# Patient Record
Sex: FEMALE | Race: WHITE | Employment: UNEMPLOYED | URBAN - METROPOLITAN AREA
[De-identification: names, ages, dates, MRNs, and addresses within clinical notes are randomized per-mention and may not be internally consistent; named-entity substitution may affect disease eponyms.]

---

## 2017-01-05 PROBLEM — K76.82 HEPATIC ENCEPHALOPATHY: Status: ACTIVE | Noted: 2017-01-05

## 2017-03-10 PROBLEM — E03.9 HYPOTHYROIDISM: Status: ACTIVE | Noted: 2017-03-10

## 2017-03-10 PROBLEM — K52.9 GASTROENTERITIS: Status: ACTIVE | Noted: 2017-03-10

## 2017-03-10 PROBLEM — R10.9 ABDOMINAL PAIN: Status: ACTIVE | Noted: 2017-03-10

## 2017-03-10 PROBLEM — K42.9 UMBILICAL HERNIA: Status: ACTIVE | Noted: 2017-03-10

## 2017-03-10 PROBLEM — F41.8 DEPRESSION WITH ANXIETY: Status: ACTIVE | Noted: 2017-03-10

## 2017-11-28 PROBLEM — N93.9 VAGINAL BLEEDING: Status: ACTIVE | Noted: 2017-11-28

## 2019-02-15 ENCOUNTER — HOSPITAL ENCOUNTER (INPATIENT)
Age: 64
LOS: 10 days | Discharge: HOSPICE | DRG: 640 | End: 2019-02-25
Attending: EMERGENCY MEDICINE | Admitting: INTERNAL MEDICINE
Payer: MEDICARE

## 2019-02-15 ENCOUNTER — APPOINTMENT (OUTPATIENT)
Dept: GENERAL RADIOLOGY | Age: 64
DRG: 640 | End: 2019-02-15
Payer: MEDICARE

## 2019-02-15 DIAGNOSIS — I47.21 TORSADES DE POINTES: Primary | ICD-10-CM

## 2019-02-15 DIAGNOSIS — I46.9 PEA (PULSELESS ELECTRICAL ACTIVITY) (HCC): ICD-10-CM

## 2019-02-15 DIAGNOSIS — E87.5 HYPERKALEMIA: ICD-10-CM

## 2019-02-15 PROBLEM — R00.1 BRADYCARDIA: Status: ACTIVE | Noted: 2019-02-15

## 2019-02-15 LAB
A/G RATIO: 0.9 (ref 1.1–2.2)
ABO/RH: NORMAL
ALBUMIN SERPL-MCNC: 2.4 G/DL (ref 3.4–5)
ALP BLD-CCNC: 114 U/L (ref 40–129)
ALT SERPL-CCNC: 22 U/L (ref 10–40)
AMPHETAMINE SCREEN, URINE: ABNORMAL
ANION GAP SERPL CALCULATED.3IONS-SCNC: 16 MMOL/L (ref 3–16)
ANISOCYTOSIS: ABNORMAL
ANTIBODY SCREEN: NORMAL
AST SERPL-CCNC: 53 U/L (ref 15–37)
ATYPICAL LYMPHOCYTE RELATIVE PERCENT: 1 % (ref 0–6)
BANDED NEUTROPHILS RELATIVE PERCENT: 2 % (ref 0–7)
BARBITURATE SCREEN URINE: ABNORMAL
BASE EXCESS ARTERIAL: -5 (ref -3–3)
BASOPHILS ABSOLUTE: 0 K/UL (ref 0–0.2)
BASOPHILS RELATIVE PERCENT: 0 %
BENZODIAZEPINE SCREEN, URINE: POSITIVE
BILIRUB SERPL-MCNC: 1 MG/DL (ref 0–1)
BUN BLDV-MCNC: 12 MG/DL (ref 7–20)
CALCIUM IONIZED: 1.53 MMOL/L (ref 1.12–1.32)
CALCIUM SERPL-MCNC: 14.3 MG/DL (ref 8.3–10.6)
CANNABINOID SCREEN URINE: ABNORMAL
CHLORIDE BLD-SCNC: 93 MMOL/L (ref 99–110)
CO2: 19 MMOL/L (ref 21–32)
COCAINE METABOLITE SCREEN URINE: ABNORMAL
CREAT SERPL-MCNC: 1.4 MG/DL (ref 0.6–1.2)
EKG ATRIAL RATE: 79 BPM
EKG DIAGNOSIS: NORMAL
EKG P AXIS: 135 DEGREES
EKG P-R INTERVAL: 222 MS
EKG Q-T INTERVAL: 362 MS
EKG QRS DURATION: 148 MS
EKG QTC CALCULATION (BAZETT): 415 MS
EKG R AXIS: -47 DEGREES
EKG T AXIS: 53 DEGREES
EKG VENTRICULAR RATE: 79 BPM
EOSINOPHILS ABSOLUTE: 0.2 K/UL (ref 0–0.6)
EOSINOPHILS RELATIVE PERCENT: 2 %
ETHANOL: NORMAL MG/DL (ref 0–0.08)
GFR AFRICAN AMERICAN: 46
GFR NON-AFRICAN AMERICAN: 38
GLOBULIN: 2.6 G/DL
GLUCOSE BLD-MCNC: 163 MG/DL (ref 70–99)
GLUCOSE BLD-MCNC: 188 MG/DL (ref 70–99)
GLUCOSE BLD-MCNC: 327 MG/DL (ref 70–99)
HCO3 ARTERIAL: 20 MMOL/L (ref 21–29)
HCT VFR BLD CALC: 29 % (ref 36–48)
HEMOGLOBIN: 11 GM/DL (ref 12–16)
HEMOGLOBIN: 9.4 G/DL (ref 12–16)
INR BLD: 1.48 (ref 0.86–1.14)
LACTATE: 4.9 MMOL/L (ref 0.4–2)
LACTIC ACID, SEPSIS: 5.7 MMOL/L (ref 0.4–1.9)
LYMPHOCYTES ABSOLUTE: 1.1 K/UL (ref 1–5.1)
LYMPHOCYTES RELATIVE PERCENT: 10 %
Lab: ABNORMAL
MCH RBC QN AUTO: 26.8 PG (ref 26–34)
MCHC RBC AUTO-ENTMCNC: 32.3 G/DL (ref 31–36)
MCV RBC AUTO: 82.7 FL (ref 80–100)
METAMYELOCYTES RELATIVE PERCENT: 1 %
METHADONE SCREEN, URINE: ABNORMAL
MONOCYTES ABSOLUTE: 0.6 K/UL (ref 0–1.3)
MONOCYTES RELATIVE PERCENT: 6 %
NEUTROPHILS ABSOLUTE: 8 K/UL (ref 1.7–7.7)
NEUTROPHILS RELATIVE PERCENT: 78 %
O2 SAT, ARTERIAL: 99 % (ref 93–100)
OPIATE SCREEN URINE: ABNORMAL
OXYCODONE URINE: ABNORMAL
PCO2 ARTERIAL: 33 MM HG (ref 35–45)
PDW BLD-RTO: 19.8 % (ref 12.4–15.4)
PERFORMED ON: ABNORMAL
PERFORMED ON: ABNORMAL
PH ARTERIAL: 7.39 (ref 7.35–7.45)
PH UA: 6
PHENCYCLIDINE SCREEN URINE: ABNORMAL
PLATELET # BLD: 220 K/UL (ref 135–450)
PLATELET SLIDE REVIEW: ADEQUATE
PMV BLD AUTO: 8.1 FL (ref 5–10.5)
PO2 ARTERIAL: 125 MM HG (ref 75–108)
POC HEMATOCRIT: 32 % (ref 36–48)
POC POTASSIUM: 7.8 MMOL/L (ref 3.5–5.1)
POC SAMPLE TYPE: ABNORMAL
POC SODIUM: 130 MMOL/L (ref 136–145)
POTASSIUM SERPL-SCNC: 8.2 MMOL/L (ref 3.5–5.1)
PROPOXYPHENE SCREEN: ABNORMAL
PROTHROMBIN TIME: 16.9 SEC (ref 9.8–13)
RBC # BLD: 3.51 M/UL (ref 4–5.2)
REASON FOR REJECTION: NORMAL
REJECTED TEST: NORMAL
SLIDE REVIEW: ABNORMAL
SODIUM BLD-SCNC: 128 MMOL/L (ref 136–145)
TCO2 ARTERIAL: 21 MMOL/L
TOTAL CK: 93 U/L (ref 26–192)
TOTAL PROTEIN: 5 G/DL (ref 6.4–8.2)
WBC # BLD: 9.9 K/UL (ref 4–11)

## 2019-02-15 PROCEDURE — 2580000003 HC RX 258: Performed by: INTERNAL MEDICINE

## 2019-02-15 PROCEDURE — 94002 VENT MGMT INPAT INIT DAY: CPT

## 2019-02-15 PROCEDURE — 96376 TX/PRO/DX INJ SAME DRUG ADON: CPT

## 2019-02-15 PROCEDURE — 84295 ASSAY OF SERUM SODIUM: CPT

## 2019-02-15 PROCEDURE — 5A1955Z RESPIRATORY VENTILATION, GREATER THAN 96 CONSECUTIVE HOURS: ICD-10-PCS | Performed by: EMERGENCY MEDICINE

## 2019-02-15 PROCEDURE — 6360000002 HC RX W HCPCS: Performed by: INTERNAL MEDICINE

## 2019-02-15 PROCEDURE — 84132 ASSAY OF SERUM POTASSIUM: CPT

## 2019-02-15 PROCEDURE — 82947 ASSAY GLUCOSE BLOOD QUANT: CPT

## 2019-02-15 PROCEDURE — 96365 THER/PROPH/DIAG IV INF INIT: CPT

## 2019-02-15 PROCEDURE — 6360000002 HC RX W HCPCS

## 2019-02-15 PROCEDURE — 85610 PROTHROMBIN TIME: CPT

## 2019-02-15 PROCEDURE — 2500000003 HC RX 250 WO HCPCS: Performed by: INTERNAL MEDICINE

## 2019-02-15 PROCEDURE — 2580000003 HC RX 258

## 2019-02-15 PROCEDURE — 82330 ASSAY OF CALCIUM: CPT

## 2019-02-15 PROCEDURE — 80053 COMPREHEN METABOLIC PANEL: CPT

## 2019-02-15 PROCEDURE — 96366 THER/PROPH/DIAG IV INF ADDON: CPT

## 2019-02-15 PROCEDURE — 0BH18EZ INSERTION OF ENDOTRACHEAL AIRWAY INTO TRACHEA, VIA NATURAL OR ARTIFICIAL OPENING ENDOSCOPIC: ICD-10-PCS | Performed by: EMERGENCY MEDICINE

## 2019-02-15 PROCEDURE — 84145 PROCALCITONIN (PCT): CPT

## 2019-02-15 PROCEDURE — 2000000000 HC ICU R&B

## 2019-02-15 PROCEDURE — 36600 WITHDRAWAL OF ARTERIAL BLOOD: CPT

## 2019-02-15 PROCEDURE — 93010 ELECTROCARDIOGRAM REPORT: CPT | Performed by: INTERNAL MEDICINE

## 2019-02-15 PROCEDURE — 36620 INSERTION CATHETER ARTERY: CPT

## 2019-02-15 PROCEDURE — 82550 ASSAY OF CK (CPK): CPT

## 2019-02-15 PROCEDURE — 36415 COLL VENOUS BLD VENIPUNCTURE: CPT

## 2019-02-15 PROCEDURE — 80307 DRUG TEST PRSMV CHEM ANLYZR: CPT

## 2019-02-15 PROCEDURE — 96375 TX/PRO/DX INJ NEW DRUG ADDON: CPT

## 2019-02-15 PROCEDURE — 02HV33Z INSERTION OF INFUSION DEVICE INTO SUPERIOR VENA CAVA, PERCUTANEOUS APPROACH: ICD-10-PCS | Performed by: EMERGENCY MEDICINE

## 2019-02-15 PROCEDURE — 83605 ASSAY OF LACTIC ACID: CPT

## 2019-02-15 PROCEDURE — 6370000000 HC RX 637 (ALT 250 FOR IP): Performed by: INTERNAL MEDICINE

## 2019-02-15 PROCEDURE — 82803 BLOOD GASES ANY COMBINATION: CPT

## 2019-02-15 PROCEDURE — 6360000002 HC RX W HCPCS: Performed by: EMERGENCY MEDICINE

## 2019-02-15 PROCEDURE — 2500000003 HC RX 250 WO HCPCS: Performed by: EMERGENCY MEDICINE

## 2019-02-15 PROCEDURE — 86850 RBC ANTIBODY SCREEN: CPT

## 2019-02-15 PROCEDURE — 5A1D90Z PERFORMANCE OF URINARY FILTRATION, CONTINUOUS, GREATER THAN 18 HOURS PER DAY: ICD-10-PCS | Performed by: INTERNAL MEDICINE

## 2019-02-15 PROCEDURE — 71045 X-RAY EXAM CHEST 1 VIEW: CPT

## 2019-02-15 PROCEDURE — 2580000003 HC RX 258: Performed by: EMERGENCY MEDICINE

## 2019-02-15 PROCEDURE — 02HV33Z INSERTION OF INFUSION DEVICE INTO SUPERIOR VENA CAVA, PERCUTANEOUS APPROACH: ICD-10-PCS | Performed by: INTERNAL MEDICINE

## 2019-02-15 PROCEDURE — 99291 CRITICAL CARE FIRST HOUR: CPT | Performed by: INTERNAL MEDICINE

## 2019-02-15 PROCEDURE — 99285 EMERGENCY DEPT VISIT HI MDM: CPT

## 2019-02-15 PROCEDURE — G0480 DRUG TEST DEF 1-7 CLASSES: HCPCS

## 2019-02-15 PROCEDURE — 96361 HYDRATE IV INFUSION ADD-ON: CPT

## 2019-02-15 PROCEDURE — 51702 INSERT TEMP BLADDER CATH: CPT

## 2019-02-15 PROCEDURE — 85014 HEMATOCRIT: CPT

## 2019-02-15 PROCEDURE — 86900 BLOOD TYPING SEROLOGIC ABO: CPT

## 2019-02-15 PROCEDURE — C9113 INJ PANTOPRAZOLE SODIUM, VIA: HCPCS | Performed by: INTERNAL MEDICINE

## 2019-02-15 PROCEDURE — 36620 INSERTION CATHETER ARTERY: CPT | Performed by: INTERNAL MEDICINE

## 2019-02-15 PROCEDURE — 86901 BLOOD TYPING SEROLOGIC RH(D): CPT

## 2019-02-15 PROCEDURE — 93005 ELECTROCARDIOGRAM TRACING: CPT | Performed by: INTERNAL MEDICINE

## 2019-02-15 PROCEDURE — 87040 BLOOD CULTURE FOR BACTERIA: CPT

## 2019-02-15 PROCEDURE — 85025 COMPLETE CBC W/AUTO DIFF WBC: CPT

## 2019-02-15 RX ORDER — LORAZEPAM 2 MG/ML
2 INJECTION INTRAMUSCULAR
Status: DISCONTINUED | OUTPATIENT
Start: 2019-02-15 | End: 2019-02-25 | Stop reason: HOSPADM

## 2019-02-15 RX ORDER — MAGNESIUM SULFATE IN WATER 40 MG/ML
INJECTION, SOLUTION INTRAVENOUS CONTINUOUS PRN
Status: COMPLETED | OUTPATIENT
Start: 2019-02-15 | End: 2019-02-15

## 2019-02-15 RX ORDER — LORAZEPAM 1 MG/1
2 TABLET ORAL
Status: DISCONTINUED | OUTPATIENT
Start: 2019-02-15 | End: 2019-02-25 | Stop reason: HOSPADM

## 2019-02-15 RX ORDER — ALBUTEROL SULFATE 90 UG/1
2 AEROSOL, METERED RESPIRATORY (INHALATION) EVERY 6 HOURS PRN
COMMUNITY

## 2019-02-15 RX ORDER — POTASSIUM CHLORIDE 750 MG/1
40 TABLET, EXTENDED RELEASE ORAL 4 TIMES DAILY
COMMUNITY

## 2019-02-15 RX ORDER — MAGNESIUM OXIDE 400 MG/1
400 TABLET ORAL 2 TIMES DAILY
COMMUNITY

## 2019-02-15 RX ORDER — ALBUTEROL SULFATE 90 UG/1
2 AEROSOL, METERED RESPIRATORY (INHALATION) EVERY 6 HOURS PRN
Status: DISCONTINUED | OUTPATIENT
Start: 2019-02-15 | End: 2019-02-19

## 2019-02-15 RX ORDER — DOXEPIN HYDROCHLORIDE 50 MG/1
50-100 CAPSULE ORAL NIGHTLY
COMMUNITY

## 2019-02-15 RX ORDER — MIDAZOLAM HYDROCHLORIDE 1 MG/ML
2 INJECTION INTRAMUSCULAR; INTRAVENOUS
Status: DISCONTINUED | OUTPATIENT
Start: 2019-02-15 | End: 2019-02-25 | Stop reason: HOSPADM

## 2019-02-15 RX ORDER — MIDAZOLAM HYDROCHLORIDE 5 MG/ML
10 INJECTION INTRAMUSCULAR; INTRAVENOUS ONCE
Status: COMPLETED | OUTPATIENT
Start: 2019-02-15 | End: 2019-02-15

## 2019-02-15 RX ORDER — TORSEMIDE 20 MG/1
20 TABLET ORAL 2 TIMES DAILY
COMMUNITY

## 2019-02-15 RX ORDER — MIDAZOLAM HYDROCHLORIDE 5 MG/ML
INJECTION INTRAMUSCULAR; INTRAVENOUS
Status: COMPLETED
Start: 2019-02-15 | End: 2019-02-15

## 2019-02-15 RX ORDER — FENTANYL CITRATE 50 UG/ML
50 INJECTION, SOLUTION INTRAMUSCULAR; INTRAVENOUS
Status: DISCONTINUED | OUTPATIENT
Start: 2019-02-15 | End: 2019-02-25 | Stop reason: HOSPADM

## 2019-02-15 RX ORDER — LORAZEPAM 1 MG/1
3 TABLET ORAL
Status: DISCONTINUED | OUTPATIENT
Start: 2019-02-15 | End: 2019-02-25 | Stop reason: HOSPADM

## 2019-02-15 RX ORDER — HEPARIN SODIUM 1000 [USP'U]/ML
2700 INJECTION, SOLUTION INTRAVENOUS; SUBCUTANEOUS PRN
Status: DISCONTINUED | OUTPATIENT
Start: 2019-02-15 | End: 2019-02-25 | Stop reason: HOSPADM

## 2019-02-15 RX ORDER — SPIRONOLACTONE 100 MG/1
100 TABLET, FILM COATED ORAL DAILY
COMMUNITY

## 2019-02-15 RX ORDER — LORAZEPAM 1 MG/1
1 TABLET ORAL
Status: DISCONTINUED | OUTPATIENT
Start: 2019-02-15 | End: 2019-02-25 | Stop reason: HOSPADM

## 2019-02-15 RX ORDER — LIDOCAINE HYDROCHLORIDE 10 MG/ML
INJECTION, SOLUTION INFILTRATION; PERINEURAL DAILY PRN
Status: COMPLETED | OUTPATIENT
Start: 2019-02-15 | End: 2019-02-15

## 2019-02-15 RX ORDER — SODIUM CHLORIDE 9 MG/ML
INJECTION, SOLUTION INTRAVENOUS CONTINUOUS
Status: DISCONTINUED | OUTPATIENT
Start: 2019-02-15 | End: 2019-02-18

## 2019-02-15 RX ORDER — CALCIUM CHLORIDE 100 MG/ML
INJECTION INTRAVENOUS; INTRAVENTRICULAR DAILY PRN
Status: COMPLETED | OUTPATIENT
Start: 2019-02-15 | End: 2019-02-15

## 2019-02-15 RX ORDER — DEXTROSE MONOHYDRATE 25 G/50ML
INJECTION, SOLUTION INTRAVENOUS DAILY PRN
Status: COMPLETED | OUTPATIENT
Start: 2019-02-15 | End: 2019-02-15

## 2019-02-15 RX ORDER — SODIUM CHLORIDE 0.9 % (FLUSH) 0.9 %
10 SYRINGE (ML) INJECTION EVERY 12 HOURS SCHEDULED
Status: DISCONTINUED | OUTPATIENT
Start: 2019-02-15 | End: 2019-02-25 | Stop reason: HOSPADM

## 2019-02-15 RX ORDER — PANTOPRAZOLE SODIUM 40 MG/10ML
40 INJECTION, POWDER, LYOPHILIZED, FOR SOLUTION INTRAVENOUS 2 TIMES DAILY
Status: DISCONTINUED | OUTPATIENT
Start: 2019-02-15 | End: 2019-02-25 | Stop reason: HOSPADM

## 2019-02-15 RX ORDER — LORAZEPAM 1 MG/1
4 TABLET ORAL
Status: DISCONTINUED | OUTPATIENT
Start: 2019-02-15 | End: 2019-02-25 | Stop reason: HOSPADM

## 2019-02-15 RX ORDER — SODIUM CHLORIDE 0.9 % (FLUSH) 0.9 %
10 SYRINGE (ML) INJECTION PRN
Status: DISCONTINUED | OUTPATIENT
Start: 2019-02-15 | End: 2019-02-25 | Stop reason: HOSPADM

## 2019-02-15 RX ORDER — ONDANSETRON 2 MG/ML
4 INJECTION INTRAMUSCULAR; INTRAVENOUS EVERY 6 HOURS PRN
Status: DISCONTINUED | OUTPATIENT
Start: 2019-02-15 | End: 2019-02-25 | Stop reason: HOSPADM

## 2019-02-15 RX ORDER — METOLAZONE 2.5 MG/1
2.5 TABLET ORAL EVERY OTHER DAY
COMMUNITY

## 2019-02-15 RX ORDER — TRAZODONE HYDROCHLORIDE 50 MG/1
50 TABLET ORAL NIGHTLY
COMMUNITY

## 2019-02-15 RX ORDER — LORAZEPAM 2 MG/ML
3 INJECTION INTRAMUSCULAR
Status: DISCONTINUED | OUTPATIENT
Start: 2019-02-15 | End: 2019-02-25 | Stop reason: HOSPADM

## 2019-02-15 RX ORDER — PROPOFOL 10 MG/ML
10 INJECTION, EMULSION INTRAVENOUS
Status: DISCONTINUED | OUTPATIENT
Start: 2019-02-16 | End: 2019-02-24

## 2019-02-15 RX ORDER — LORAZEPAM 2 MG/ML
4 INJECTION INTRAMUSCULAR
Status: DISCONTINUED | OUTPATIENT
Start: 2019-02-15 | End: 2019-02-25 | Stop reason: HOSPADM

## 2019-02-15 RX ORDER — 0.9 % SODIUM CHLORIDE 0.9 %
1000 INTRAVENOUS SOLUTION INTRAVENOUS ONCE
Status: COMPLETED | OUTPATIENT
Start: 2019-02-15 | End: 2019-02-15

## 2019-02-15 RX ORDER — LORAZEPAM 2 MG/ML
1 INJECTION INTRAMUSCULAR
Status: DISCONTINUED | OUTPATIENT
Start: 2019-02-15 | End: 2019-02-25 | Stop reason: HOSPADM

## 2019-02-15 RX ORDER — SODIUM CHLORIDE 9 MG/ML
INJECTION, SOLUTION INTRAVENOUS
Status: COMPLETED
Start: 2019-02-15 | End: 2019-02-15

## 2019-02-15 RX ADMIN — EPINEPHRINE 1 MG: 0.1 INJECTION, SOLUTION ENDOTRACHEAL; INTRACARDIAC; INTRAVENOUS at 14:17

## 2019-02-15 RX ADMIN — SODIUM CHLORIDE: 9 INJECTION, SOLUTION INTRAVENOUS at 15:53

## 2019-02-15 RX ADMIN — CALCIUM CHLORIDE 1 G: 100 INJECTION INTRAVENOUS; INTRAVENTRICULAR at 14:19

## 2019-02-15 RX ADMIN — SODIUM BICARBONATE 50 MEQ: 84 INJECTION, SOLUTION INTRAVENOUS at 14:25

## 2019-02-15 RX ADMIN — Medication 500 ML/HR: at 17:58

## 2019-02-15 RX ADMIN — MIDAZOLAM HYDROCHLORIDE 10 MG: 5 INJECTION INTRAMUSCULAR; INTRAVENOUS at 14:46

## 2019-02-15 RX ADMIN — MIDAZOLAM 2 MG/HR: 5 INJECTION INTRAMUSCULAR; INTRAVENOUS at 16:09

## 2019-02-15 RX ADMIN — LIDOCAINE HYDROCHLORIDE 100 MG: 10 INJECTION, SOLUTION INFILTRATION; PERINEURAL at 14:23

## 2019-02-15 RX ADMIN — MIDAZOLAM HYDROCHLORIDE 2 MG: 1 INJECTION, SOLUTION INTRAMUSCULAR; INTRAVENOUS at 22:06

## 2019-02-15 RX ADMIN — SODIUM BICARBONATE 50 MEQ: 84 INJECTION, SOLUTION INTRAVENOUS at 14:17

## 2019-02-15 RX ADMIN — PANTOPRAZOLE SODIUM 40 MG: 40 INJECTION, POWDER, FOR SOLUTION INTRAVENOUS at 22:11

## 2019-02-15 RX ADMIN — SODIUM CHLORIDE 1000 ML: 9 INJECTION, SOLUTION INTRAVENOUS at 14:45

## 2019-02-15 RX ADMIN — CALCIUM CHLORIDE 1 G: 100 INJECTION INTRAVENOUS; INTRAVENTRICULAR at 14:12

## 2019-02-15 RX ADMIN — MAGNESIUM SULFATE HEPTAHYDRATE 2 G: 40 INJECTION, SOLUTION INTRAVENOUS at 14:25

## 2019-02-15 RX ADMIN — MIDAZOLAM HYDROCHLORIDE 10 MG: 5 INJECTION, SOLUTION INTRAMUSCULAR; INTRAVENOUS at 15:33

## 2019-02-15 RX ADMIN — SODIUM CHLORIDE 3 G: 900 INJECTION INTRAVENOUS at 21:13

## 2019-02-15 RX ADMIN — MIDAZOLAM HYDROCHLORIDE 10 MG: 5 INJECTION INTRAMUSCULAR; INTRAVENOUS at 15:33

## 2019-02-15 RX ADMIN — Medication 2 MCG/MIN: at 15:21

## 2019-02-15 RX ADMIN — MIDAZOLAM HYDROCHLORIDE 10 MG: 5 INJECTION, SOLUTION INTRAMUSCULAR; INTRAVENOUS at 14:46

## 2019-02-15 RX ADMIN — CALCIUM CHLORIDE 1 G: 100 INJECTION INTRAVENOUS; INTRAVENTRICULAR at 14:23

## 2019-02-15 RX ADMIN — DEXTROSE MONOHYDRATE 25 G: 25 INJECTION, SOLUTION INTRAVENOUS at 14:23

## 2019-02-15 RX ADMIN — EPINEPHRINE 1 MG: 0.1 INJECTION, SOLUTION ENDOTRACHEAL; INTRACARDIAC; INTRAVENOUS at 14:12

## 2019-02-15 RX ADMIN — SODIUM CHLORIDE: 9 INJECTION, SOLUTION INTRAVENOUS at 21:00

## 2019-02-15 RX ADMIN — EPINEPHRINE 1 MG: 0.1 INJECTION, SOLUTION ENDOTRACHEAL; INTRACARDIAC; INTRAVENOUS at 14:15

## 2019-02-15 RX ADMIN — Medication 10 ML: at 22:10

## 2019-02-15 RX ADMIN — SODIUM BICARBONATE: 84 INJECTION INTRAVENOUS at 16:13

## 2019-02-15 ASSESSMENT — PULMONARY FUNCTION TESTS
PIF_VALUE: 16
PIF_VALUE: 24
PIF_VALUE: 24

## 2019-02-15 ASSESSMENT — PAIN SCALES - GENERAL: PAINLEVEL_OUTOF10: 0

## 2019-02-15 NOTE — ED NOTES
Charge RN and registration attempted to contact family for patient no number listed in patient chart was valid.  Unable to contact family for patient     Armani Navarro RN  02/15/19 0231

## 2019-02-15 NOTE — ED PROVIDER NOTES
lymph nodes again noted. There is no pneumothorax. 1. Recommend retracting endotracheal tube by 3 cm. 2. Hazy opacity in the medial right heart border may reflect partial atelectasis of the right middle lobe versus an infectious/inflammatory process. Continued radiographic follow-up is recommended. Results were called by Dr. Renny Lay to Dr. Alyce Blunt on 2/15/2019 at 15:30. PROCEDURES  PROCEDURE:  ENDOTRACHEAL INTUBATION  . Surekha Bishop was pre-oxygenated as best as possible. Suction was ready. The patient was sedated, then paralyzed; please see the chart for the medications and dosages administered. The vocal cords were visualized, and the endotracheal tube was inserted without complication. Tracheal position was confirmed using auscultation, capnometry, tube condensation, and chest x-ray. The tube was appropriately secured. Sedation was provided for endotracheal tube and ventilatory comfort. Patient Name: Surekha Bishop   Medical Record Number: [id number]  Date: 2/15/2019   Time: 4:43 PM   Room/Bed: [place] [address]  Central Line Placement Procedure Note  Indication: poor peripheral access    Consent: Unable to be obtained due to the emergent nature of this procedure. Procedure: The patient was positioned appropriately and the skin over the right internal jugular vein was prepped with chlorhexidine. Local anesthesia was not performed due to the emergent nature of this procedure. A large bore needle was used to identify the vein. A guide wire was then inserted into the vein through the needle. A triple lumen catheter was then inserted into the vessel over the guide wire using the Seldinger technique. All ports showed good, free flowing blood return and were flushed with saline solution. The catheter was then securely fastened to the skin with sutures and covered with a sterile dressing. A post procedure X-ray was ordered and is still pending at this time.     The patient tolerated the

## 2019-02-15 NOTE — CONSULTS
Pulmonary & Critical Care Medicine Consultation    ASSESSMENT:  · Acute Hypoxic Respiratory Failure due to cardiac arrest  · Cardiac Arrest due to hyperkalemia  · Circulatory Shock due to cardiac arrest  · Possible Hypoxic Encephalopathy - moves arms and legs but does not follow commands  · Possible Aspiration Pneumonia - bilateral air space disease on chest xray  · Hyperkalemia  · Acute Kidney Injury  · Cirrhosis  · GERD    PLAN:  · Mechanical Ventilation with Assist Control   · Midazolam infusion for sedation  · Fentanyl as needed for analgesia  · Cardiology consult for cardiac arrest  · CRRT per Nephrology  · Sputum culture  · Start empiric Unasyn for possible aspiration pneumonia  · Targeted Temperature Management for possible hypoxic encephalopathy  · Will use temperature of 35-36 degrees rather than hypothermia due to instability with cardiac arrhythmias  · No warming blanket due to possible hypoxic encephalopathy  · Protonix for GERD and GI prophylaxis  · DVT Prophylaxis with Lower Extremity Sequential Compression Device       REASON FOR CONSULTATION/CC: acute respiratory failure     CONSULTING PHYSICIAN: Dr. Rae Lamar:  Unable to obtain due to intubation and sedation. HISTORY OF PRESENT ILLNESS:  The patient is intubated and on mechanical ventilation so the history is from the medical record. She was unable to get up at home so she called 911. She had a cardiac arrest in the ambulance and CPR was started. ACLS protocol was followed in the ED and ROSC was obtained. She had multiple arrhythmias and did require shocks. She was intubated and placed on mechanical ventilation. She is sedated with Midazolam infusion. She was found to have severe hyperkalemia. Nephrology placed a Vascath and CRRT will be started soon. She is on Levophed for circulatory shock. She does not follow commands but does move arms and legs to painful stimulation.      She has a history of cirrhosis and diastolic heart failure. PAST MEDICAL HISTORY:  Past Medical History:   Diagnosis Date    [facility] [facility]     Diastolic CHF ([facility] [address] [facility] 8/2/13    echo    EtOH dependence ([facility] [address] [facility]     Gallstones     GERD (gastroesophageal reflux disease)     Humerus fracture May 2013    Hypothyroidism     Seizure [facility])     last one 2015    Sinusitis chronic, frontal     Spleen enlargement      PAST SURGICAL HISTORY:  Past Surgical History:   Procedure Laterality Date    SINUS SURGERY      TUBAL LIGATION      UPPER GASTROINTESTINAL ENDOSCOPY  11/29/2017       FAMILY HISTORY:  family history is not on file. SOCIAL HISTORY:   reports that she has been smoking Cigarettes. She has a 17.50 pack-year smoking history. She has never used smokeless tobacco.    MEDICATIONS:  Scheduled Meds:   sodium chloride flush  10 mL Intravenous 2 times per day    pantoprazole  40 mg Intravenous BID    ampicillin-sulbactam  3 g Intravenous Q12H     Continuous Infusions:   norepinephrine 34. 027 mcg/min (02/15/19 1709)    prismaSATE BGK 2/0 500 mL/hr (02/15/19 1758)    prismaSol BGK 2/0 500 mL/hr (02/15/19 1758)    sodium chloride 100 mL/hr at 02/15/19 1553    sodium bicarbonate infusion 50 mL/hr at 02/15/19 1613    midazolam 2 mg/hr (02/15/19 1609)     PRN Meds:  albuterol sulfate HFA, sodium chloride flush, magnesium hydroxide, ondansetron, LORazepam **OR** LORazepam **OR** LORazepam **OR** LORazepam **OR** LORazepam **OR** LORazepam **OR** LORazepam **OR** LORazepam, fentanNYL    ALLERGIES:  Patient is allergic to codeine. REVIEW OF SYSTEMS:  Unable to obtain due to intubation and sedation. PHYSICAL EXAM:  VITAL SIGNS: Blood pressure (!) 124/56, pulse 59, temperature 96.4 °F (35.8 °C), temperature source Core, resp. rate 15, weight 200 lb 9.9 oz (91 kg), SpO2 100 %. Gen:   Intubated and on mechanical ventilation. Eyes:   Equal pupils. Clear conjunctiva. ENT:   Endotracheal tube is in place.  Lips and tongue

## 2019-02-15 NOTE — PROGRESS NOTES
Renal Temporary HD Catheter Femoral Procedure Note    Femoral artery and vein localized by ultrasound. Under sterile technique, femoral artery palpated. Needle introduced medial to artery with good venous backflow. Guidewire introduced. Dilator introduced. HD catheter placed over guidewire. Good backflow noted in both lumens.

## 2019-02-15 NOTE — CONSULTS
MD Romi Larson MD Levie Lamas, MD                                  Office: [phone]                 Fax: [phone]          [employer]                     NEPHROLOGY CONSULT NOTE:     PATIENT NAME: Stacy Burrell  : 1955  MRN: [id number]      Patient seen and examined . Brief Summary Note --full consult report follows. R femoral hemodialysis catheter placed . Good flow . No complications. Severe hyperkalemia  With EKG changes     K.8.3  Shock state  Acute Kidney Injury   Severe hypotension on pressors  Chronic liver disease  Ascites       Plan:    Send stat renal panel  on arrival in ICU  Begin CRRT as soon as possible   No anticoagulation ( liver disease)  Continue bi carbonate infusion     Patient is critically ill       renal panel or CBC  UA, urine lytes and urine creatinine or spot urine protein and creatinine ratio  CXR  daily weights, strict I/O's and low sodium diet (i.e., 2 gm)        Active Problems:    Bradycardia  Resolved Problems:    * No resolved hospital problems. *          Labs reviewed by me       CBC:   Recent Labs      02/15/19   1435   WBC  9.9   HGB  9.4*   HCT  29.0*   MCV  82.7   PLT  220     BMP:   Recent Labs      02/15/19   1420   NA  128*   K  8.2*   CL  93*   CO2  19*   BUN  12   CREATININE  1.4*     Magnesium:   Lab Results   Component Value Date    MG 1.80 2017    MG 1.80 2017    MG 1.60 2016       Arterial Blood Gasses  No results for input(s): PH, PCO2, PO2 in the last 72 hours. Invalid input(s): Reese Cuellar    UA:No results for input(s): NITRITE, COLORU, PHUR, LABCAST, WBCUA, RBCUA, MUCUS, TRICHOMONAS, YEAST, BACTERIA, CLARITYU, SPECGRAV, LEUKOCYTESUR, UROBILINOGEN, BILIRUBINUR, BLOODU, GLUCOSEU, AMORPHOUS in the last 72 hours.     Invalid input(s): Viviane Stevens    LIVER PROFILE:   Recent Labs      02/15/19   1420   AST  53*   ALT  22   BILITOT  1.0   ALKPHOS  114     PT/INR:    Lab

## 2019-02-15 NOTE — ED NOTES
Bed: 01  Expected date:   Expected time:   Means of arrival:   Comments:  Emergency Only     Dat Maloney RN  02/15/19 9688

## 2019-02-15 NOTE — H&P
tablet Take 400 mg by mouth 2 times daily   Yes Historical Provider, MD   potassium chloride (KLOR-CON M) 10 MEQ extended release tablet Take 40 mEq by mouth 4 times daily   Yes Historical Provider, MD   spironolactone (ALDACTONE) 100 MG tablet Take 100 mg by mouth daily   Yes Historical Provider, MD   torsemide (DEMADEX) 20 MG tablet Take 20 mg by mouth 2 times daily   Yes Historical Provider, MD   traZODone (DESYREL) 50 MG tablet Take 50 mg by mouth nightly   Yes Historical Provider, MD   metolazone (ZAROXOLYN) 2.5 MG tablet Take 2.5 mg by mouth every other day   Yes Historical Provider, MD   albuterol sulfate  (90 Base) MCG/ACT inhaler Inhale 2 puffs into the lungs every 6 hours as needed for Wheezing   Yes Historical Provider, MD       Allergies:  Codeine    Social History:      The patient currently lives At home by herself    TOBACCO:   reports that she has been smoking Cigarettes. She has a 17.50 pack-year smoking history. She has never used smokeless tobacco.  ETOH:   reports that she does not drink alcohol. Family History:      Reviewed in detail and negative for DM, CAD, Cancer, CVA. Positive as follows:    No family history on file. REVIEW OF SYSTEMS:   Could not be obtained as patient is intubated and no family is available here. PHYSICAL EXAM PERFORMED:    Pulse 99   Temp 97.7 °F (36.5 °C) (Infrared)   Resp 16   Wt 187 lb 6.3 oz (85 kg)   SpO2 100%   BMI 35.41 kg/m²     General appearance: Intubated. HEENT:  Normal cephalic, atraumatic without obvious deformity. Pupils equal, round, and reactive to light. Extra ocular muscles intact. Conjunctivae/corneas clear. Neck: Supple, with full range of motion. No jugular venous distention. Trachea midline. Respiratory: Crackles heard bilaterally anteriorly. Cardiovascular:  Regular rate and rhythm with normal S1/S2 without murmurs, rubs or gallops. Abdomen: Soft, non-tender, non-distended with normal bowel sounds.   Musculoskeletal: No clubbing, cyanosis or edema bilaterally. Full range of motion without deformity. 2 IO  lines one on  each site noted. Skin: Skin color, texture, turgor normal.  No rashes or lesions. Neurologic:  Neurovascularly intact without any focal sensory/motor deficits. Cranial nerves: II-XII intact, grossly non-focal.  Psychiatric: Sedated. Capillary Refill: Brisk,< 3 seconds   Peripheral Pulses: +2 palpable, equal bilaterally       Labs:     No results for input(s): WBC, HGB, HCT, PLT in the last 72 hours. No results for input(s): NA, K, CL, CO2, BUN, CREATININE, CALCIUM, PHOS in the last 72 hours. Invalid input(s): MAGNES  No results for input(s): AST, ALT, BILIDIR, BILITOT, ALKPHOS in the last 72 hours. No results for input(s): INR in the last 72 hours. No results for input(s): [place] [employer] in the last 72 hours. Urinalysis:      Lab Results   Component Value Date    NITRU Negative 11/28/2017    WBCUA 2 11/28/2017    BACTERIA 1+ 11/28/2017    RBCUA 3 11/28/2017    BLOODU Negative 11/28/2017    SPECGRAV 1.019 11/28/2017    GLUCOSEU Negative 11/28/2017       Radiology:       EKG:  I have reviewed the EKG with the following interpretation: bradycardia, sinusoidal and undetermined rhythm noted     XR CHEST PORTABLE    (Results Pending)       ASSESSMENT/PLAN:     #1. Cardiac arrest: Secondary to hyperkalemia and AV block. Status post resuscitation with successful ROSC. Still still hypotensive. Central line placement planned. Levophed drip. Troponins ×2. Echocardiogram. Cardiology consult. Treat hyperkalemia with hemodialysis. #2, hyperkalemia: Nephrology consulted. Hemodialysis catheter replaced right now. CRRT planned in ICU. #3. Acute kidney injury: Fluids per nephrology. #4. Acute respiratory failure: Secondary to cardiac arrest. Intubated. Pulmonology consulted for vent management. #5. Shock: Secondary to cardiac event. Consult cardiology. Labile fed drape. Low suspicion for infection.

## 2019-02-16 ENCOUNTER — APPOINTMENT (OUTPATIENT)
Dept: GENERAL RADIOLOGY | Age: 64
DRG: 640 | End: 2019-02-16
Payer: MEDICARE

## 2019-02-16 LAB
ANION GAP SERPL CALCULATED.3IONS-SCNC: 11 MMOL/L (ref 3–16)
ANION GAP SERPL CALCULATED.3IONS-SCNC: 11 MMOL/L (ref 3–16)
ANION GAP SERPL CALCULATED.3IONS-SCNC: 12 MMOL/L (ref 3–16)
ANION GAP SERPL CALCULATED.3IONS-SCNC: 8 MMOL/L (ref 3–16)
BASE EXCESS ARTERIAL: 0.5 MMOL/L (ref -3–3)
BUN BLDV-MCNC: 10 MG/DL (ref 7–20)
BUN BLDV-MCNC: 10 MG/DL (ref 7–20)
BUN BLDV-MCNC: 11 MG/DL (ref 7–20)
BUN BLDV-MCNC: 8 MG/DL (ref 7–20)
CALCIUM IONIZED: 0.77 MMOL/L (ref 1.12–1.32)
CALCIUM IONIZED: 0.85 MMOL/L (ref 1.12–1.32)
CALCIUM SERPL-MCNC: 6.1 MG/DL (ref 8.3–10.6)
CALCIUM SERPL-MCNC: 6.8 MG/DL (ref 8.3–10.6)
CALCIUM SERPL-MCNC: 7.9 MG/DL (ref 8.3–10.6)
CALCIUM SERPL-MCNC: 9 MG/DL (ref 8.3–10.6)
CARBOXYHEMOGLOBIN ARTERIAL: 1.1 % (ref 0–1.5)
CHLORIDE BLD-SCNC: 95 MMOL/L (ref 99–110)
CHLORIDE BLD-SCNC: 97 MMOL/L (ref 99–110)
CHLORIDE BLD-SCNC: 98 MMOL/L (ref 99–110)
CHLORIDE BLD-SCNC: 99 MMOL/L (ref 99–110)
CO2: 22 MMOL/L (ref 21–32)
CO2: 24 MMOL/L (ref 21–32)
CO2: 24 MMOL/L (ref 21–32)
CO2: 25 MMOL/L (ref 21–32)
CREAT SERPL-MCNC: 1 MG/DL (ref 0.6–1.2)
CREAT SERPL-MCNC: 1.1 MG/DL (ref 0.6–1.2)
CREAT SERPL-MCNC: 1.2 MG/DL (ref 0.6–1.2)
CREAT SERPL-MCNC: 1.2 MG/DL (ref 0.6–1.2)
GFR AFRICAN AMERICAN: 55
GFR AFRICAN AMERICAN: 55
GFR AFRICAN AMERICAN: >60
GFR AFRICAN AMERICAN: >60
GFR NON-AFRICAN AMERICAN: 45
GFR NON-AFRICAN AMERICAN: 45
GFR NON-AFRICAN AMERICAN: 50
GFR NON-AFRICAN AMERICAN: 56
GLUCOSE BLD-MCNC: 152 MG/DL (ref 70–99)
GLUCOSE BLD-MCNC: 178 MG/DL (ref 70–99)
GLUCOSE BLD-MCNC: 180 MG/DL (ref 70–99)
GLUCOSE BLD-MCNC: 205 MG/DL (ref 70–99)
GLUCOSE BLD-MCNC: 219 MG/DL (ref 70–99)
GLUCOSE BLD-MCNC: 221 MG/DL (ref 70–99)
GLUCOSE BLD-MCNC: 232 MG/DL (ref 70–99)
HCO3 ARTERIAL: 25.4 MMOL/L (ref 21–29)
HCT VFR BLD CALC: 30.1 % (ref 36–48)
HCT VFR BLD CALC: 31.5 % (ref 36–48)
HEMOGLOBIN, ART, EXTENDED: 9.6 G/DL (ref 12–16)
HEMOGLOBIN: 10 G/DL (ref 12–16)
HEMOGLOBIN: 9.7 G/DL (ref 12–16)
LACTIC ACID: 3.1 MMOL/L (ref 0.4–2)
LEFT VENTRICULAR EJECTION FRACTION HIGH VALUE: 60 %
LEFT VENTRICULAR EJECTION FRACTION MODE: NORMAL
LV EF: 55 %
LV EF: 58 %
LVEF MODALITY: NORMAL
MAGNESIUM: 2.1 MG/DL (ref 1.8–2.4)
MAGNESIUM: 2.2 MG/DL (ref 1.8–2.4)
METHEMOGLOBIN ARTERIAL: 0.6 %
O2 CONTENT ARTERIAL: 14 ML/DL
O2 SAT, ARTERIAL: 100 %
O2 THERAPY: ABNORMAL
PCO2 ARTERIAL: 41.4 MMHG (ref 35–45)
PERFORMED ON: ABNORMAL
PH ARTERIAL: 7.4 (ref 7.35–7.45)
PH VENOUS: 7.45 (ref 7.35–7.45)
PH VENOUS: 7.47 (ref 7.35–7.45)
PHOSPHORUS: 1.9 MG/DL (ref 2.5–4.9)
PHOSPHORUS: 2.1 MG/DL (ref 2.5–4.9)
PO2 ARTERIAL: 164 MMHG (ref 75–108)
POTASSIUM REFLEX MAGNESIUM: 5.9 MMOL/L (ref 3.5–5.1)
POTASSIUM SERPL-SCNC: 4.6 MMOL/L (ref 3.5–5.1)
POTASSIUM SERPL-SCNC: 5.6 MMOL/L (ref 3.5–5.1)
POTASSIUM SERPL-SCNC: 5.6 MMOL/L (ref 3.5–5.1)
PROCALCITONIN: 0.09 NG/ML (ref 0–0.15)
SODIUM BLD-SCNC: 130 MMOL/L (ref 136–145)
SODIUM BLD-SCNC: 131 MMOL/L (ref 136–145)
SODIUM BLD-SCNC: 132 MMOL/L (ref 136–145)
SODIUM BLD-SCNC: 133 MMOL/L (ref 136–145)
TCO2 ARTERIAL: 59.9 MMOL/L
TROPONIN: 0.03 NG/ML

## 2019-02-16 PROCEDURE — 2580000003 HC RX 258: Performed by: INTERNAL MEDICINE

## 2019-02-16 PROCEDURE — 84100 ASSAY OF PHOSPHORUS: CPT

## 2019-02-16 PROCEDURE — 6370000000 HC RX 637 (ALT 250 FOR IP): Performed by: INTERNAL MEDICINE

## 2019-02-16 PROCEDURE — 6360000002 HC RX W HCPCS: Performed by: INTERNAL MEDICINE

## 2019-02-16 PROCEDURE — 82803 BLOOD GASES ANY COMBINATION: CPT

## 2019-02-16 PROCEDURE — 36415 COLL VENOUS BLD VENIPUNCTURE: CPT

## 2019-02-16 PROCEDURE — 85018 HEMOGLOBIN: CPT

## 2019-02-16 PROCEDURE — 99291 CRITICAL CARE FIRST HOUR: CPT | Performed by: INTERNAL MEDICINE

## 2019-02-16 PROCEDURE — 85014 HEMATOCRIT: CPT

## 2019-02-16 PROCEDURE — 94762 N-INVAS EAR/PLS OXIMTRY CONT: CPT

## 2019-02-16 PROCEDURE — 94003 VENT MGMT INPAT SUBQ DAY: CPT

## 2019-02-16 PROCEDURE — 2500000003 HC RX 250 WO HCPCS: Performed by: INTERNAL MEDICINE

## 2019-02-16 PROCEDURE — 2000000000 HC ICU R&B

## 2019-02-16 PROCEDURE — 93306 TTE W/DOPPLER COMPLETE: CPT

## 2019-02-16 PROCEDURE — 82330 ASSAY OF CALCIUM: CPT

## 2019-02-16 PROCEDURE — 83735 ASSAY OF MAGNESIUM: CPT

## 2019-02-16 PROCEDURE — 80048 BASIC METABOLIC PNL TOTAL CA: CPT

## 2019-02-16 PROCEDURE — 83605 ASSAY OF LACTIC ACID: CPT

## 2019-02-16 PROCEDURE — 94750 HC PULMONARY COMPLIANCE STUDY: CPT

## 2019-02-16 PROCEDURE — 71045 X-RAY EXAM CHEST 1 VIEW: CPT

## 2019-02-16 PROCEDURE — C9113 INJ PANTOPRAZOLE SODIUM, VIA: HCPCS | Performed by: INTERNAL MEDICINE

## 2019-02-16 PROCEDURE — 84484 ASSAY OF TROPONIN QUANT: CPT

## 2019-02-16 PROCEDURE — 2700000000 HC OXYGEN THERAPY PER DAY

## 2019-02-16 PROCEDURE — 94770 HC ETCO2 MONITOR DAILY: CPT

## 2019-02-16 RX ORDER — DEXTROSE MONOHYDRATE 25 G/50ML
12.5 INJECTION, SOLUTION INTRAVENOUS PRN
Status: DISCONTINUED | OUTPATIENT
Start: 2019-02-16 | End: 2019-02-25 | Stop reason: HOSPADM

## 2019-02-16 RX ORDER — DEXTROSE MONOHYDRATE 50 MG/ML
100 INJECTION, SOLUTION INTRAVENOUS PRN
Status: DISCONTINUED | OUTPATIENT
Start: 2019-02-16 | End: 2019-02-25 | Stop reason: HOSPADM

## 2019-02-16 RX ADMIN — Medication 500 ML/HR: at 07:59

## 2019-02-16 RX ADMIN — PROPOFOL 50 MCG/KG/MIN: 10 INJECTION, EMULSION INTRAVENOUS at 00:17

## 2019-02-16 RX ADMIN — Medication 0.33 MCG/KG/MIN: at 20:47

## 2019-02-16 RX ADMIN — PROPOFOL 45 MCG/KG/MIN: 10 INJECTION, EMULSION INTRAVENOUS at 11:18

## 2019-02-16 RX ADMIN — SODIUM PHOSPHATE, MONOBASIC, MONOHYDRATE 6 MMOL: 276; 142 INJECTION, SOLUTION INTRAVENOUS at 14:19

## 2019-02-16 RX ADMIN — PROPOFOL 45 MCG/KG/MIN: 10 INJECTION, EMULSION INTRAVENOUS at 02:24

## 2019-02-16 RX ADMIN — PANTOPRAZOLE SODIUM 40 MG: 40 INJECTION, POWDER, FOR SOLUTION INTRAVENOUS at 20:48

## 2019-02-16 RX ADMIN — PROPOFOL 45 MCG/KG/MIN: 10 INJECTION, EMULSION INTRAVENOUS at 14:18

## 2019-02-16 RX ADMIN — FENTANYL CITRATE 0.5 MCG/KG/HR: 50 INJECTION, SOLUTION INTRAMUSCULAR; INTRAVENOUS at 15:37

## 2019-02-16 RX ADMIN — Medication 10 ML: at 20:48

## 2019-02-16 RX ADMIN — Medication 10 ML: at 09:05

## 2019-02-16 RX ADMIN — Medication 500 ML/HR: at 08:09

## 2019-02-16 RX ADMIN — Medication 1000 ML/HR: at 16:20

## 2019-02-16 RX ADMIN — PROPOFOL 45 MCG/KG/MIN: 10 INJECTION, EMULSION INTRAVENOUS at 05:58

## 2019-02-16 RX ADMIN — Medication 1000 ML/HR: at 21:13

## 2019-02-16 RX ADMIN — PROPOFOL 45 MCG/KG/MIN: 10 INJECTION, EMULSION INTRAVENOUS at 06:52

## 2019-02-16 RX ADMIN — PROPOFOL 35 MCG/KG/MIN: 10 INJECTION, EMULSION INTRAVENOUS at 18:08

## 2019-02-16 RX ADMIN — INSULIN LISPRO 4 UNITS: 100 INJECTION, SOLUTION INTRAVENOUS; SUBCUTANEOUS at 16:45

## 2019-02-16 RX ADMIN — PANTOPRAZOLE SODIUM 40 MG: 40 INJECTION, POWDER, FOR SOLUTION INTRAVENOUS at 09:05

## 2019-02-16 RX ADMIN — INSULIN LISPRO 2 UNITS: 100 INJECTION, SOLUTION INTRAVENOUS; SUBCUTANEOUS at 12:16

## 2019-02-16 RX ADMIN — INSULIN LISPRO 4 UNITS: 100 INJECTION, SOLUTION INTRAVENOUS; SUBCUTANEOUS at 20:19

## 2019-02-16 RX ADMIN — Medication 0.3 MCG/KG/MIN: at 01:11

## 2019-02-16 RX ADMIN — CALCIUM GLUCONATE 2 G: 98 INJECTION, SOLUTION INTRAVENOUS at 16:20

## 2019-02-16 RX ADMIN — SODIUM CHLORIDE 3 G: 900 INJECTION INTRAVENOUS at 20:14

## 2019-02-16 RX ADMIN — SODIUM BICARBONATE: 84 INJECTION INTRAVENOUS at 11:24

## 2019-02-16 RX ADMIN — SODIUM CHLORIDE: 9 INJECTION, SOLUTION INTRAVENOUS at 12:49

## 2019-02-16 RX ADMIN — Medication 0.34 MCG/KG/MIN: at 11:18

## 2019-02-16 RX ADMIN — SODIUM PHOSPHATE, MONOBASIC, MONOHYDRATE 12 MMOL: 276; 142 INJECTION, SOLUTION INTRAVENOUS at 20:14

## 2019-02-16 RX ADMIN — HEPARIN SODIUM 2700 UNITS: 1000 INJECTION INTRAVENOUS; SUBCUTANEOUS at 09:27

## 2019-02-16 RX ADMIN — FENTANYL CITRATE 0.5 MCG/KG/HR: 50 INJECTION, SOLUTION INTRAMUSCULAR; INTRAVENOUS at 00:17

## 2019-02-16 RX ADMIN — SODIUM CHLORIDE 3 G: 900 INJECTION INTRAVENOUS at 09:05

## 2019-02-16 RX ADMIN — CALCIUM GLUCONATE 2 G: 98 INJECTION, SOLUTION INTRAVENOUS at 21:15

## 2019-02-16 ASSESSMENT — PAIN SCALES - GENERAL
PAINLEVEL_OUTOF10: 0
PAINLEVEL_OUTOF10: 0

## 2019-02-16 ASSESSMENT — PULMONARY FUNCTION TESTS
PIF_VALUE: 22
PIF_VALUE: 21
PIF_VALUE: 22

## 2019-02-16 NOTE — CONSULTS
sign monitoring, telemetry monitoring, continuous pulse oximety, IV medication, clinical response to the IV medications, documentation time , consultation time, interpretation of lab data, review of nursing notes and old record review. I appreciate the opportunity of cooperating in the care of this patient.     Kalpana Hollis M.D., [facility]

## 2019-02-16 NOTE — PROGRESS NOTES
Component Value Date    MG 1.80 11/28/2017    MG 1.80 11/27/2017    MG 1.60 12/23/2016     Lab Results   Component Value Date    CREATININE 1.1 02/16/2019     [facility]   CMP:  Recent Labs      02/15/19   1420  02/16/19   0030  02/16/19 0615   NA  128*  131*  133*   K  8.2*  5.9*  5.6*   CL  93*  98*  97*   CO2  19*  22  24   BUN  12  11  10   CREATININE  1.4*  1.2  1.1   CALCIUM  14.3*  9.0  7.9*   GLUCOSE  327*  221*  180*           CBC:  Recent Labs      02/15/19   1435  02/15/19   1754  02/16/19   0030   WBC  9.9   --    --    RBC  3.51*   --    --    HGB  9.4*  11.0*  9.7*   HCT  29.0*   --   30.1*   PLT  220   --    --    MCV  82.7   --    --    MCH  26.8   --    --    MCHC  32.3   --    --    RDW  19.8*   --    --    BANDSPCT  2   --    --       BMP:  Recent Labs      02/15/19   1420  02/16/19 0030  02/16/19 0615   NA  128*  131*  133*   K  8.2*  5.9*  5.6*   CL  93*  98*  97*   CO2  19*  22  24   BUN  12  11  10   CREATININE  1.4*  1.2  1.1   CALCIUM  14.3*  9.0  7.9*   GLUCOSE  327*  221*  180*      ABG:  Recent Labs      02/15/19   1754 02/16/19 0615   PHART  7.391  7.397   MYL4YNE  33.0*  41.4   PO2ART  125.0*  164.0*   BMO0NVQ  20.0*  25.4   H2FCADGA  99  100.0   BEART  -5*  0.5         Arterial Blood Gasses  No results for input(s): PH, PCO2, PO2 in the last 72 hours.     Invalid input(s): [id number], INSPIREDO2    UA:  Recent Labs      02/15/19   1654   PHUR  6.0       LIVER PROFILE:   Recent Labs      02/15/19   1420   AST  53*   ALT  22   BILITOT  1.0   ALKPHOS  114     PT/INR:    Lab Results   Component Value Date    PROTIME 16.9 02/15/2019    PROTIME 17.0 11/27/2017    PROTIME 17.2 03/09/2017    INR 1.48 02/15/2019    INR 1.50 11/27/2017    INR 1.52 03/09/2017     PTT:    Lab Results   Component Value Date    APTT 34.3 11/27/2017    APTT 33.5 03/09/2017    APTT 30.8 12/23/2016     MITA:  Lab Results   Component Value Date    MITA Negative 12/24/2016           RADIOLOGY:

## 2019-02-16 NOTE — PROGRESS NOTES
CRRT  prior to my arrival on shift. Connected patient with Yandel RN at 0898. Numbers obtained at 2000. Access pressure alarming extremely high multiple times. Switched lines but no help. Discovers kinked tubing on machine. Will restart CRRT with new set.

## 2019-02-17 ENCOUNTER — APPOINTMENT (OUTPATIENT)
Dept: GENERAL RADIOLOGY | Age: 64
DRG: 640 | End: 2019-02-17
Payer: MEDICARE

## 2019-02-17 LAB
ALBUMIN SERPL-MCNC: 2.9 G/DL (ref 3.4–5)
ALP BLD-CCNC: 149 U/L (ref 40–129)
ALT SERPL-CCNC: 31 U/L (ref 10–40)
ANION GAP SERPL CALCULATED.3IONS-SCNC: 11 MMOL/L (ref 3–16)
ANION GAP SERPL CALCULATED.3IONS-SCNC: 12 MMOL/L (ref 3–16)
AST SERPL-CCNC: 55 U/L (ref 15–37)
BASE EXCESS ARTERIAL: 0.1 MMOL/L (ref -3–3)
BILIRUB SERPL-MCNC: 2.7 MG/DL (ref 0–1)
BILIRUBIN DIRECT: 1.6 MG/DL (ref 0–0.3)
BILIRUBIN, INDIRECT: 1.1 MG/DL (ref 0–1)
BUN BLDV-MCNC: 5 MG/DL (ref 7–20)
BUN BLDV-MCNC: 5 MG/DL (ref 7–20)
BUN BLDV-MCNC: 6 MG/DL (ref 7–20)
BUN BLDV-MCNC: 7 MG/DL (ref 7–20)
CALCIUM IONIZED: 0.6 MMOL/L (ref 1.12–1.32)
CALCIUM IONIZED: 0.65 MMOL/L (ref 1.12–1.32)
CALCIUM IONIZED: 0.7 MMOL/L (ref 1.12–1.32)
CALCIUM IONIZED: 0.94 MMOL/L (ref 1.12–1.32)
CALCIUM SERPL-MCNC: 4.7 MG/DL (ref 8.3–10.6)
CALCIUM SERPL-MCNC: 5.2 MG/DL (ref 8.3–10.6)
CALCIUM SERPL-MCNC: 5.6 MG/DL (ref 8.3–10.6)
CALCIUM SERPL-MCNC: 7.5 MG/DL (ref 8.3–10.6)
CARBOXYHEMOGLOBIN ARTERIAL: 1.2 % (ref 0–1.5)
CHLORIDE BLD-SCNC: 93 MMOL/L (ref 99–110)
CHLORIDE BLD-SCNC: 94 MMOL/L (ref 99–110)
CHLORIDE BLD-SCNC: 94 MMOL/L (ref 99–110)
CHLORIDE BLD-SCNC: 99 MMOL/L (ref 99–110)
CO2: 23 MMOL/L (ref 21–32)
CO2: 23 MMOL/L (ref 21–32)
CO2: 24 MMOL/L (ref 21–32)
CO2: 25 MMOL/L (ref 21–32)
CREAT SERPL-MCNC: 0.6 MG/DL (ref 0.6–1.2)
CREAT SERPL-MCNC: 0.7 MG/DL (ref 0.6–1.2)
CREAT SERPL-MCNC: 0.8 MG/DL (ref 0.6–1.2)
CREAT SERPL-MCNC: 0.9 MG/DL (ref 0.6–1.2)
GFR AFRICAN AMERICAN: >60
GFR NON-AFRICAN AMERICAN: >60
GLUCOSE BLD-MCNC: 105 MG/DL (ref 70–99)
GLUCOSE BLD-MCNC: 137 MG/DL (ref 70–99)
GLUCOSE BLD-MCNC: 147 MG/DL (ref 70–99)
GLUCOSE BLD-MCNC: 150 MG/DL (ref 70–99)
GLUCOSE BLD-MCNC: 156 MG/DL (ref 70–99)
GLUCOSE BLD-MCNC: 205 MG/DL (ref 70–99)
GLUCOSE BLD-MCNC: 207 MG/DL (ref 70–99)
GLUCOSE BLD-MCNC: 207 MG/DL (ref 70–99)
GLUCOSE BLD-MCNC: 209 MG/DL (ref 70–99)
GLUCOSE BLD-MCNC: 226 MG/DL (ref 70–99)
HCO3 ARTERIAL: 24.7 MMOL/L (ref 21–29)
HCT VFR BLD CALC: 29.6 % (ref 36–48)
HCT VFR BLD CALC: 30.7 % (ref 36–48)
HCT VFR BLD CALC: 31.3 % (ref 36–48)
HEMOGLOBIN, ART, EXTENDED: 10.1 G/DL (ref 12–16)
HEMOGLOBIN: 10.1 G/DL (ref 12–16)
HEMOGLOBIN: 9.5 G/DL (ref 12–16)
HEMOGLOBIN: 9.9 G/DL (ref 12–16)
MAGNESIUM: 1.7 MG/DL (ref 1.8–2.4)
MAGNESIUM: 2 MG/DL (ref 1.8–2.4)
MCH RBC QN AUTO: 25.9 PG (ref 26–34)
MCHC RBC AUTO-ENTMCNC: 32.1 G/DL (ref 31–36)
MCV RBC AUTO: 80.7 FL (ref 80–100)
METHEMOGLOBIN ARTERIAL: 0.6 %
O2 CONTENT ARTERIAL: 14 ML/DL
O2 SAT, ARTERIAL: 99.6 %
O2 THERAPY: ABNORMAL
PCO2 ARTERIAL: 38.9 MMHG (ref 35–45)
PDW BLD-RTO: 19.9 % (ref 12.4–15.4)
PERFORMED ON: ABNORMAL
PH ARTERIAL: 7.41 (ref 7.35–7.45)
PH VENOUS: 7.46 (ref 7.35–7.45)
PH VENOUS: 7.46 (ref 7.35–7.45)
PH VENOUS: 7.48 (ref 7.35–7.45)
PH VENOUS: 7.48 (ref 7.35–7.45)
PHOSPHORUS: 1.8 MG/DL (ref 2.5–4.9)
PHOSPHORUS: 1.9 MG/DL (ref 2.5–4.9)
PHOSPHORUS: 2.5 MG/DL (ref 2.5–4.9)
PHOSPHORUS: 3.2 MG/DL (ref 2.5–4.9)
PLATELET # BLD: ABNORMAL K/UL (ref 135–450)
PLATELET SLIDE REVIEW: ABNORMAL
PMV BLD AUTO: ABNORMAL FL (ref 5–10.5)
PO2 ARTERIAL: 133 MMHG (ref 75–108)
POTASSIUM SERPL-SCNC: 3 MMOL/L (ref 3.5–5.1)
POTASSIUM SERPL-SCNC: 3.4 MMOL/L (ref 3.5–5.1)
POTASSIUM SERPL-SCNC: 3.8 MMOL/L (ref 3.5–5.1)
POTASSIUM SERPL-SCNC: 3.8 MMOL/L (ref 3.5–5.1)
RBC # BLD: 3.88 M/UL (ref 4–5.2)
SLIDE REVIEW: ABNORMAL
SODIUM BLD-SCNC: 128 MMOL/L (ref 136–145)
SODIUM BLD-SCNC: 129 MMOL/L (ref 136–145)
SODIUM BLD-SCNC: 130 MMOL/L (ref 136–145)
SODIUM BLD-SCNC: 133 MMOL/L (ref 136–145)
TCO2 ARTERIAL: 58 MMOL/L
TOTAL PROTEIN: 5.9 G/DL (ref 6.4–8.2)
WBC # BLD: 11.5 K/UL (ref 4–11)

## 2019-02-17 PROCEDURE — 94762 N-INVAS EAR/PLS OXIMTRY CONT: CPT

## 2019-02-17 PROCEDURE — 2580000003 HC RX 258: Performed by: INTERNAL MEDICINE

## 2019-02-17 PROCEDURE — 6360000002 HC RX W HCPCS: Performed by: INTERNAL MEDICINE

## 2019-02-17 PROCEDURE — 2500000003 HC RX 250 WO HCPCS: Performed by: INTERNAL MEDICINE

## 2019-02-17 PROCEDURE — 99233 SBSQ HOSP IP/OBS HIGH 50: CPT | Performed by: INTERNAL MEDICINE

## 2019-02-17 PROCEDURE — 99291 CRITICAL CARE FIRST HOUR: CPT | Performed by: INTERNAL MEDICINE

## 2019-02-17 PROCEDURE — 80076 HEPATIC FUNCTION PANEL: CPT

## 2019-02-17 PROCEDURE — 80048 BASIC METABOLIC PNL TOTAL CA: CPT

## 2019-02-17 PROCEDURE — 85014 HEMATOCRIT: CPT

## 2019-02-17 PROCEDURE — 82330 ASSAY OF CALCIUM: CPT

## 2019-02-17 PROCEDURE — 2700000000 HC OXYGEN THERAPY PER DAY

## 2019-02-17 PROCEDURE — 85018 HEMOGLOBIN: CPT

## 2019-02-17 PROCEDURE — C9113 INJ PANTOPRAZOLE SODIUM, VIA: HCPCS | Performed by: INTERNAL MEDICINE

## 2019-02-17 PROCEDURE — 94750 HC PULMONARY COMPLIANCE STUDY: CPT

## 2019-02-17 PROCEDURE — 85027 COMPLETE CBC AUTOMATED: CPT

## 2019-02-17 PROCEDURE — 84100 ASSAY OF PHOSPHORUS: CPT

## 2019-02-17 PROCEDURE — 82803 BLOOD GASES ANY COMBINATION: CPT

## 2019-02-17 PROCEDURE — 2000000000 HC ICU R&B

## 2019-02-17 PROCEDURE — 94003 VENT MGMT INPAT SUBQ DAY: CPT

## 2019-02-17 PROCEDURE — 83735 ASSAY OF MAGNESIUM: CPT

## 2019-02-17 PROCEDURE — 71045 X-RAY EXAM CHEST 1 VIEW: CPT

## 2019-02-17 RX ORDER — SUCCINYLCHOLINE/SOD CL,ISO/PF 200MG/10ML
SYRINGE (ML) INTRAVENOUS
Status: DISCONTINUED
Start: 2019-02-17 | End: 2019-02-17

## 2019-02-17 RX ORDER — MAGNESIUM SULFATE 1 G/100ML
1 INJECTION INTRAVENOUS PRN
Status: DISCONTINUED | OUTPATIENT
Start: 2019-02-17 | End: 2019-02-23

## 2019-02-17 RX ADMIN — PANTOPRAZOLE SODIUM 40 MG: 40 INJECTION, POWDER, FOR SOLUTION INTRAVENOUS at 09:15

## 2019-02-17 RX ADMIN — Medication 1000 ML/HR: at 12:36

## 2019-02-17 RX ADMIN — SODIUM BICARBONATE: 84 INJECTION INTRAVENOUS at 07:14

## 2019-02-17 RX ADMIN — CALCIUM GLUCONATE 3 G: 98 INJECTION, SOLUTION INTRAVENOUS at 14:27

## 2019-02-17 RX ADMIN — INSULIN LISPRO 4 UNITS: 100 INJECTION, SOLUTION INTRAVENOUS; SUBCUTANEOUS at 04:14

## 2019-02-17 RX ADMIN — Medication 1000 ML/HR: at 18:04

## 2019-02-17 RX ADMIN — INSULIN LISPRO 4 UNITS: 100 INJECTION, SOLUTION INTRAVENOUS; SUBCUTANEOUS at 08:20

## 2019-02-17 RX ADMIN — PANTOPRAZOLE SODIUM 40 MG: 40 INJECTION, POWDER, FOR SOLUTION INTRAVENOUS at 20:22

## 2019-02-17 RX ADMIN — CALCIUM GLUCONATE 3 G: 98 INJECTION, SOLUTION INTRAVENOUS at 07:22

## 2019-02-17 RX ADMIN — FENTANYL CITRATE 0.5 MCG/KG/HR: 50 INJECTION, SOLUTION INTRAMUSCULAR; INTRAVENOUS at 14:32

## 2019-02-17 RX ADMIN — Medication 0.52 MCG/KG/MIN: at 19:29

## 2019-02-17 RX ADMIN — CALCIUM GLUCONATE 1 G: 98 INJECTION, SOLUTION INTRAVENOUS at 19:30

## 2019-02-17 RX ADMIN — Medication 1000 ML/HR: at 02:23

## 2019-02-17 RX ADMIN — INSULIN LISPRO 2 UNITS: 100 INJECTION, SOLUTION INTRAVENOUS; SUBCUTANEOUS at 16:27

## 2019-02-17 RX ADMIN — Medication 1000 ML/HR: at 02:26

## 2019-02-17 RX ADMIN — Medication 1000 ML/HR: at 07:14

## 2019-02-17 RX ADMIN — CALCIUM GLUCONATE 3 G: 98 INJECTION, SOLUTION INTRAVENOUS at 00:58

## 2019-02-17 RX ADMIN — Medication 10 ML: at 09:15

## 2019-02-17 RX ADMIN — SODIUM CHLORIDE 3 G: 900 INJECTION INTRAVENOUS at 20:22

## 2019-02-17 RX ADMIN — Medication 1000 ML/HR: at 23:21

## 2019-02-17 RX ADMIN — Medication 0.39 MCG/KG/MIN: at 06:30

## 2019-02-17 RX ADMIN — PROPOFOL 30 MCG/KG/MIN: 10 INJECTION, EMULSION INTRAVENOUS at 06:30

## 2019-02-17 RX ADMIN — SODIUM CHLORIDE: 9 INJECTION, SOLUTION INTRAVENOUS at 19:53

## 2019-02-17 RX ADMIN — Medication 10 ML: at 20:22

## 2019-02-17 RX ADMIN — SODIUM CHLORIDE 3 G: 900 INJECTION INTRAVENOUS at 09:57

## 2019-02-17 RX ADMIN — SODIUM PHOSPHATE, MONOBASIC, MONOHYDRATE 12 MMOL: 276; 142 INJECTION, SOLUTION INTRAVENOUS at 15:08

## 2019-02-17 RX ADMIN — INSULIN LISPRO 4 UNITS: 100 INJECTION, SOLUTION INTRAVENOUS; SUBCUTANEOUS at 00:22

## 2019-02-17 RX ADMIN — SODIUM CHLORIDE: 9 INJECTION, SOLUTION INTRAVENOUS at 05:31

## 2019-02-17 RX ADMIN — Medication 1000 ML/HR: at 23:05

## 2019-02-17 RX ADMIN — Medication 0.5 MCG/KG/MIN: at 12:40

## 2019-02-17 RX ADMIN — PROPOFOL 30 MCG/KG/MIN: 10 INJECTION, EMULSION INTRAVENOUS at 00:58

## 2019-02-17 ASSESSMENT — PULMONARY FUNCTION TESTS
PIF_VALUE: 23
PIF_VALUE: 22
PIF_VALUE: 21
PIF_VALUE: 21
PIF_VALUE: 18
PIF_VALUE: 22

## 2019-02-17 ASSESSMENT — PAIN SCALES - GENERAL
PAINLEVEL_OUTOF10: 0

## 2019-02-17 NOTE — PROGRESS NOTES
Hospitalist Progress Note      PCP: Karishma Drake    Date of Admission: 2/15/2019    Chief Complaint: cardiac arrest     Subjective: remains on vent ; On bicarb and CRRT       Medications:  Reviewed    Infusion Medications    dextrose      norepinephrine 0.36 mcg/kg/min (02/16/19 2127)    prismaSATE BGK 2/0 1,000 mL/hr (02/16/19 2113)    prismaSol BGK 2/0 1,000 mL/hr (02/16/19 2113)    sodium chloride 100 mL/hr at 02/16/19 1249    sodium bicarbonate infusion 50 mL/hr at 02/16/19 1124    propofol 35 mcg/kg/min (02/16/19 1808)    fentaNYL (SUBLIMAZE) infusion 0.5 mcg/kg/hr (02/16/19 1537)     Scheduled Medications    insulin lispro  0-12 Units Subcutaneous Q4H    sodium chloride flush  10 mL Intravenous 2 times per day    pantoprazole  40 mg Intravenous BID    ampicillin-sulbactam  3 g Intravenous Q12H     PRN Meds: dextrose, glucagon (rDNA), dextrose, calcium gluconate IVPB **OR** calcium gluconate IVPB **OR** calcium gluconate IVPB **OR** calcium gluconate IVPB, sodium phosphate IVPB **OR** sodium phosphate IVPB **OR** sodium phosphate IVPB **OR** sodium phosphate IVPB, albuterol sulfate HFA, sodium chloride flush, magnesium hydroxide, ondansetron, LORazepam **OR** LORazepam **OR** LORazepam **OR** LORazepam **OR** LORazepam **OR** LORazepam **OR** LORazepam **OR** LORazepam, fentanNYL, heparin (porcine), midazolam      Intake/Output Summary (Last 24 hours) at 02/16/19 2200  Last data filed at 02/16/19 2110   Gross per 24 hour   Intake          3792.64 ml   Output             5575 ml   Net         -1782.36 ml       Physical Exam Performed:    BP 92/65   Pulse 72   Temp (!) 90 °F (32.2 °C) (Core)   Resp 16   Wt 203 lb 11.3 oz (92.4 kg)   SpO2 100%   BMI 38.49 kg/m²     General appearance: No apparent distress, appears stated age and cooperative. HEENT: Pupils equal, round, and reactive to light. Conjunctivae/corneas clear. Neck: Supple, with full range of motion.  No jugular venous

## 2019-02-17 NOTE — PROGRESS NOTES
Shift assessment completed, see doc flowsheets. Patient sedated on ventilator in bed, AC 12  FiO2 50% PEEP 5, Levophed, Bicarb, Fentanyl, Propofol, NS infusing, see MAR for dosing. CRRT presently in progress. Lungs are clear, diminished in bases. Skin is cool and dry, looks bronze color, CVC to R IJ and R fem vas cath, clean dry and intact. Abdomen is soft, rotund with BS. [place] patent and draining small amt clear yellow urine. Plan of care is monitor labs and replace electrolytes as needed. Maintain CRRT. Repositioned for comfort. SR on monitor. Fall precautions in place, will continue to monitor.

## 2019-02-17 NOTE — PROGRESS NOTES
ALISAWA protocol     #7. H/o varices/portal gastropathy:  Cont protonix iv.  H/h relatively stable            DVT Prophylaxis: SCD's      Diet: Diet NPO Effective Now  Code Status: Full Code    PT/OT Eval Status: not now    Dispo - cont ICU care     Rey Brenner MD

## 2019-02-17 NOTE — PROGRESS NOTES
MD Graciela Pack MD [place] Lipoma, MD                                  Office: [phone]                 Fax: [phone]          [employer]                     NEPHROLOGY IN PATIENT  ICU PROGRESS NOTE:     PATIENT NAME: Darron Oneill  : 1955  MRN: [id number]                    Subjective: Intubated on ventilator. Still hypotensive  On pressor support. No urine output. [place] catheter  On CRRT. Medications reviewed. Antibiotic dose reviewed. Assessment and Plan    Assessment:        Acute kidney injury-severe-oliguric-no improvement. Hyperkalemia-severe-slow improvement. On CRRT. Multiple electrolyte imbalance. Hypocalcemia  Severe hypotension on pressor support. Shock state. History of chronic liver disease. Ascites  Respiratory failure intubated on ventilator. Plan:       Patient remains critically ill. She is intubated and on ventilator. Severe kidney failure. No urine output. Continue on CRRT. CRRT orders reviewed with ICU nurse. Less clotting  Would like to avoid citrate due to history of chronic liver disease. Need to avoid heparin, increased bleeding risk. Increase the flow rate of CRRT to 200 mL/h pump speed. Increase CRRT to 1000 mL/h both dialysate and replacement fluid. Continue current dialysate bath. Discontinue IV bicarbonate. Increase calcium in CRRT bag  Potassium low increase potassium in CRRT    Medications reviewed. Antibiotic dose reviewed and adjusted for renal function. Nephrotoxic medications discontinued. Patient is critically ill. Discussed with treatment team.  Time spent 35 minutes. EXAM  Vitals:    19 1100   BP:    Pulse: 73   Resp: 14   Temp:    SpO2: 100%       Intake/Output Summary (Last 24 hours) at 19 1137  Last data filed at 19 1107   Gross per 24 hour   Intake             2750 ml   Output             5226 ml   Net            -2476 ml        Ill Appearing. INSPIREDO2    UA:  Recent Labs      02/15/19   1654   PHUR  6.0       LIVER PROFILE:   Recent Labs      02/15/19   1420  02/17/19   0629   AST  53*  55*   ALT  22  31   BILIDIR   --   1.6*   BILITOT  1.0  2.7*   ALKPHOS  114  149*     PT/INR:    Lab Results   Component Value Date    PROTIME 16.9 02/15/2019    PROTIME 17.0 11/27/2017    PROTIME 17.2 03/09/2017    INR 1.48 02/15/2019    INR 1.50 11/27/2017    INR 1.52 03/09/2017     PTT:    Lab Results   Component Value Date    APTT 34.3 11/27/2017    APTT 33.5 03/09/2017    APTT 30.8 12/23/2016     MITA:    Lab Results   Component Value Date    MITA Negative 12/24/2016           RADIOLOGY:        Imaging Results. Chest X Ray reviwed by me    Chest Xray Reviewed by me  Renal Ultrasound Reviewed by me    EKG reviewed by me.                 Electronically Signed: Franklyn Brown MD 2/17/2019 11:37 AM

## 2019-02-17 NOTE — PROGRESS NOTES
[facility]   Progress Note  CHF/Pulmonary Hypertension Cardiology    Chief complaint: We are following this patient for bradycardia and hyperkalemia  HPI:  Nargis Stinson is a 58 yo female with a history of alcohol cirrhosis, diastolic HF, tobacco use who called the rescue squad after a fall at home yesterday. She called 911 for help to get up. When they arrived, her pulse was weak. CPR was started. In the ED she had asystole. She had sinusoidal pattern on EKG. Hyperkalemia was suspected. She was given bicarb, calcium, magnesium, lidocaine, epi. She was shocked for VF and intubated.      Her potassium came back at 8.2. Dialysis catheter was placed and hemodialysis was started. Her home meds include spironolactone 100 mg a day plus 160 meq of KCl per day. Blood pressure has been low.     Her heart rhythm has improved. Her potassium is down to 5.6. No family at bedside. Unknown down time.     We are consulted for bradycardia.     She gets most of her care through [address] [place] [address]. Her chart shows alcohol induced cirrhosis. There was concern that she might have blood when intubated, concern for varices. ROS:  She remains intubated and sedated. Had cardiopulmonary arrest at home due to hyperkalemia. She is still getting dialysis/CRRT. Hemodynamically more stable.       Drips  Propofol  fentanyl  Medications/Labs all Reviewed    Lab Results   Component Value Date    WBC 11.5 (H) 02/17/2019    HGB 10.1 (L) 02/17/2019    HCT 31.3 (L) 02/17/2019    MCV 80.7 02/17/2019    PLT see below 02/17/2019     Lab Results   Component Value Date    CREATININE 0.7 02/17/2019    BUN 6 (L) 02/17/2019     (L) 02/17/2019    K 3.4 (L) 02/17/2019    CL 93 (L) 02/17/2019    CO2 23 02/17/2019     Lab Results   Component Value Date    INR 1.48 (H) 02/15/2019    PROTIME 16.9 (H) 02/15/2019        Physical Examination:    BP (!) 78/57   Pulse 72   Temp (!) 87.8 °F (31 °C) (Core)   Resp 16   Wt 196 lb 6.9 pointes ([facility] [address]    2. PEA (Pulseless electrical activity) ([facility]    3. Hyperkalemia     4. Bradycardia due to hyperkalemia  5. Chronic diastolic HF  6. 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 This time excludes any time that may have been spent performing procedures. This includes but not limited to vital sign monitoring, telemetry monitoring, continuous pulse oximety, IV medication, clinical response to the IV medications, documentation time , consultation time, interpretation of lab data, review of nursing notes and old record review.        Prince Jennings MD, 2/17/2019 11:02 AM

## 2019-02-18 LAB
ANION GAP SERPL CALCULATED.3IONS-SCNC: 10 MMOL/L (ref 3–16)
ANION GAP SERPL CALCULATED.3IONS-SCNC: 10 MMOL/L (ref 3–16)
ANION GAP SERPL CALCULATED.3IONS-SCNC: 12 MMOL/L (ref 3–16)
ANION GAP SERPL CALCULATED.3IONS-SCNC: 12 MMOL/L (ref 3–16)
BUN BLDV-MCNC: 5 MG/DL (ref 7–20)
BUN BLDV-MCNC: 6 MG/DL (ref 7–20)
CALCIUM IONIZED: 0.88 MMOL/L (ref 1.12–1.32)
CALCIUM IONIZED: 0.95 MMOL/L (ref 1.12–1.32)
CALCIUM IONIZED: 0.97 MMOL/L (ref 1.12–1.32)
CALCIUM IONIZED: 1.01 MMOL/L (ref 1.12–1.32)
CALCIUM IONIZED: 1.05 MMOL/L (ref 1.12–1.32)
CALCIUM SERPL-MCNC: 6.5 MG/DL (ref 8.3–10.6)
CALCIUM SERPL-MCNC: 7.4 MG/DL (ref 8.3–10.6)
CALCIUM SERPL-MCNC: 7.8 MG/DL (ref 8.3–10.6)
CALCIUM SERPL-MCNC: 7.8 MG/DL (ref 8.3–10.6)
CHLORIDE BLD-SCNC: 100 MMOL/L (ref 99–110)
CHLORIDE BLD-SCNC: 101 MMOL/L (ref 99–110)
CHLORIDE BLD-SCNC: 96 MMOL/L (ref 99–110)
CHLORIDE BLD-SCNC: 98 MMOL/L (ref 99–110)
CO2: 21 MMOL/L (ref 21–32)
CO2: 23 MMOL/L (ref 21–32)
CO2: 24 MMOL/L (ref 21–32)
CO2: 25 MMOL/L (ref 21–32)
CREAT SERPL-MCNC: 0.8 MG/DL (ref 0.6–1.2)
EKG ATRIAL RATE: 104 BPM
EKG DIAGNOSIS: NORMAL
EKG P AXIS: 61 DEGREES
EKG P-R INTERVAL: 188 MS
EKG Q-T INTERVAL: 346 MS
EKG QRS DURATION: 76 MS
EKG QTC CALCULATION (BAZETT): 454 MS
EKG R AXIS: -35 DEGREES
EKG T AXIS: 53 DEGREES
EKG VENTRICULAR RATE: 104 BPM
GFR AFRICAN AMERICAN: >60
GFR NON-AFRICAN AMERICAN: >60
GLUCOSE BLD-MCNC: 114 MG/DL (ref 70–99)
GLUCOSE BLD-MCNC: 116 MG/DL (ref 70–99)
GLUCOSE BLD-MCNC: 132 MG/DL (ref 70–99)
GLUCOSE BLD-MCNC: 135 MG/DL (ref 70–99)
GLUCOSE BLD-MCNC: 136 MG/DL (ref 70–99)
GLUCOSE BLD-MCNC: 138 MG/DL (ref 70–99)
GLUCOSE BLD-MCNC: 143 MG/DL (ref 70–99)
GLUCOSE BLD-MCNC: 159 MG/DL (ref 70–99)
GLUCOSE BLD-MCNC: 160 MG/DL (ref 70–99)
GLUCOSE BLD-MCNC: 168 MG/DL (ref 70–99)
HCT VFR BLD CALC: 29.6 % (ref 36–48)
HCT VFR BLD CALC: 31.4 % (ref 36–48)
HEMOGLOBIN: 9.3 G/DL (ref 12–16)
HEMOGLOBIN: 9.8 G/DL (ref 12–16)
MAGNESIUM: 1.9 MG/DL (ref 1.8–2.4)
MAGNESIUM: 2.1 MG/DL (ref 1.8–2.4)
MAGNESIUM: 2.2 MG/DL (ref 1.8–2.4)
MAGNESIUM: 2.2 MG/DL (ref 1.8–2.4)
PERFORMED ON: ABNORMAL
PH VENOUS: 7.41 (ref 7.35–7.45)
PH VENOUS: 7.42 (ref 7.35–7.45)
PH VENOUS: 7.42 (ref 7.35–7.45)
PH VENOUS: 7.43 (ref 7.35–7.45)
PH VENOUS: 7.43 (ref 7.35–7.45)
PHOSPHORUS: 2.5 MG/DL (ref 2.5–4.9)
PHOSPHORUS: 2.7 MG/DL (ref 2.5–4.9)
PHOSPHORUS: 2.7 MG/DL (ref 2.5–4.9)
PHOSPHORUS: 3 MG/DL (ref 2.5–4.9)
POTASSIUM SERPL-SCNC: 4.4 MMOL/L (ref 3.5–5.1)
POTASSIUM SERPL-SCNC: 4.5 MMOL/L (ref 3.5–5.1)
POTASSIUM SERPL-SCNC: 4.8 MMOL/L (ref 3.5–5.1)
POTASSIUM SERPL-SCNC: 4.8 MMOL/L (ref 3.5–5.1)
SODIUM BLD-SCNC: 132 MMOL/L (ref 136–145)
SODIUM BLD-SCNC: 133 MMOL/L (ref 136–145)
SODIUM BLD-SCNC: 133 MMOL/L (ref 136–145)
SODIUM BLD-SCNC: 134 MMOL/L (ref 136–145)

## 2019-02-18 PROCEDURE — 93010 ELECTROCARDIOGRAM REPORT: CPT | Performed by: INTERNAL MEDICINE

## 2019-02-18 PROCEDURE — 2580000003 HC RX 258: Performed by: INTERNAL MEDICINE

## 2019-02-18 PROCEDURE — 99223 1ST HOSP IP/OBS HIGH 75: CPT | Performed by: PSYCHIATRY & NEUROLOGY

## 2019-02-18 PROCEDURE — 94762 N-INVAS EAR/PLS OXIMTRY CONT: CPT

## 2019-02-18 PROCEDURE — 6360000002 HC RX W HCPCS: Performed by: INTERNAL MEDICINE

## 2019-02-18 PROCEDURE — 37799 UNLISTED PX VASCULAR SURGERY: CPT

## 2019-02-18 PROCEDURE — 2700000000 HC OXYGEN THERAPY PER DAY

## 2019-02-18 PROCEDURE — 80048 BASIC METABOLIC PNL TOTAL CA: CPT

## 2019-02-18 PROCEDURE — 85014 HEMATOCRIT: CPT

## 2019-02-18 PROCEDURE — 99291 CRITICAL CARE FIRST HOUR: CPT | Performed by: INTERNAL MEDICINE

## 2019-02-18 PROCEDURE — C9113 INJ PANTOPRAZOLE SODIUM, VIA: HCPCS | Performed by: INTERNAL MEDICINE

## 2019-02-18 PROCEDURE — 83735 ASSAY OF MAGNESIUM: CPT

## 2019-02-18 PROCEDURE — 85018 HEMOGLOBIN: CPT

## 2019-02-18 PROCEDURE — 2000000000 HC ICU R&B

## 2019-02-18 PROCEDURE — 84100 ASSAY OF PHOSPHORUS: CPT

## 2019-02-18 PROCEDURE — 82330 ASSAY OF CALCIUM: CPT

## 2019-02-18 PROCEDURE — 94003 VENT MGMT INPAT SUBQ DAY: CPT

## 2019-02-18 PROCEDURE — 94770 HC ETCO2 MONITOR DAILY: CPT

## 2019-02-18 PROCEDURE — 93005 ELECTROCARDIOGRAM TRACING: CPT | Performed by: INTERNAL MEDICINE

## 2019-02-18 PROCEDURE — 2500000003 HC RX 250 WO HCPCS: Performed by: INTERNAL MEDICINE

## 2019-02-18 PROCEDURE — 94750 HC PULMONARY COMPLIANCE STUDY: CPT

## 2019-02-18 PROCEDURE — P9047 ALBUMIN (HUMAN), 25%, 50ML: HCPCS | Performed by: INTERNAL MEDICINE

## 2019-02-18 RX ORDER — SODIUM CHLORIDE 9 MG/ML
INJECTION, SOLUTION INTRAVENOUS
Status: DISPENSED
Start: 2019-02-18 | End: 2019-02-19

## 2019-02-18 RX ORDER — ALBUMIN (HUMAN) 12.5 G/50ML
12.5 SOLUTION INTRAVENOUS EVERY 8 HOURS
Status: COMPLETED | OUTPATIENT
Start: 2019-02-18 | End: 2019-02-19

## 2019-02-18 RX ORDER — FOLIC ACID 1 MG/1
1 TABLET ORAL DAILY
Status: DISCONTINUED | OUTPATIENT
Start: 2019-02-18 | End: 2019-02-25 | Stop reason: HOSPADM

## 2019-02-18 RX ORDER — SODIUM CHLORIDE 9 MG/ML
INJECTION, SOLUTION INTRAVENOUS
Status: DISPENSED
Start: 2019-02-18 | End: 2019-02-18

## 2019-02-18 RX ORDER — HEPARIN SODIUM 1000 [USP'U]/ML
1000 INJECTION, SOLUTION INTRAVENOUS; SUBCUTANEOUS ONCE
Status: DISCONTINUED | OUTPATIENT
Start: 2019-02-18 | End: 2019-02-18

## 2019-02-18 RX ORDER — HEPARIN SODIUM 10000 [USP'U]/100ML
500 INJECTION, SOLUTION INTRAVENOUS CONTINUOUS
Status: DISCONTINUED | OUTPATIENT
Start: 2019-02-18 | End: 2019-02-18

## 2019-02-18 RX ADMIN — Medication 1000 ML/HR: at 19:38

## 2019-02-18 RX ADMIN — PANTOPRAZOLE SODIUM 40 MG: 40 INJECTION, POWDER, FOR SOLUTION INTRAVENOUS at 20:45

## 2019-02-18 RX ADMIN — ALBUMIN (HUMAN) 12.5 G: 0.25 INJECTION, SOLUTION INTRAVENOUS at 09:30

## 2019-02-18 RX ADMIN — ALBUMIN (HUMAN) 12.5 G: 0.25 INJECTION, SOLUTION INTRAVENOUS at 18:06

## 2019-02-18 RX ADMIN — Medication 0.68 MCG/KG/MIN: at 00:56

## 2019-02-18 RX ADMIN — CALCIUM GLUCONATE 2 G: 98 INJECTION, SOLUTION INTRAVENOUS at 00:48

## 2019-02-18 RX ADMIN — CALCIUM GLUCONATE 1 G: 98 INJECTION, SOLUTION INTRAVENOUS at 19:03

## 2019-02-18 RX ADMIN — PANTOPRAZOLE SODIUM 40 MG: 40 INJECTION, POWDER, FOR SOLUTION INTRAVENOUS at 08:10

## 2019-02-18 RX ADMIN — Medication 10 ML: at 20:44

## 2019-02-18 RX ADMIN — SODIUM CHLORIDE: 9 INJECTION, SOLUTION INTRAVENOUS at 05:15

## 2019-02-18 RX ADMIN — CALCIUM GLUCONATE 1 G: 98 INJECTION, SOLUTION INTRAVENOUS at 06:13

## 2019-02-18 RX ADMIN — Medication 1000 ML/HR: at 09:20

## 2019-02-18 RX ADMIN — INSULIN LISPRO 2 UNITS: 100 INJECTION, SOLUTION INTRAVENOUS; SUBCUTANEOUS at 20:40

## 2019-02-18 RX ADMIN — Medication 10 ML: at 08:10

## 2019-02-18 RX ADMIN — Medication 1000 ML/HR: at 18:42

## 2019-02-18 RX ADMIN — THIAMINE HYDROCHLORIDE 300 MG: 100 INJECTION, SOLUTION INTRAMUSCULAR; INTRAVENOUS at 12:33

## 2019-02-18 RX ADMIN — SODIUM CHLORIDE 3 G: 900 INJECTION INTRAVENOUS at 08:08

## 2019-02-18 RX ADMIN — Medication 0.83 MCG/KG/MIN: at 05:09

## 2019-02-18 RX ADMIN — SODIUM CHLORIDE 3 G: 900 INJECTION INTRAVENOUS at 20:26

## 2019-02-18 RX ADMIN — HEPARIN SODIUM: 1000 INJECTION INTRAVENOUS; SUBCUTANEOUS at 19:17

## 2019-02-18 RX ADMIN — Medication 1000 ML/HR: at 13:59

## 2019-02-18 RX ADMIN — Medication 1000 ML/HR: at 04:47

## 2019-02-18 RX ADMIN — CALCIUM GLUCONATE 1 G: 98 INJECTION, SOLUTION INTRAVENOUS at 13:15

## 2019-02-18 RX ADMIN — INSULIN LISPRO 2 UNITS: 100 INJECTION, SOLUTION INTRAVENOUS; SUBCUTANEOUS at 15:48

## 2019-02-18 ASSESSMENT — PAIN SCALES - GENERAL
PAINLEVEL_OUTOF10: 0

## 2019-02-18 ASSESSMENT — PULMONARY FUNCTION TESTS
PIF_VALUE: 25
PIF_VALUE: 25
PIF_VALUE: 19
PIF_VALUE: 18
PIF_VALUE: 12

## 2019-02-18 NOTE — PROGRESS NOTES
Pulmonary & Critical Care Medicine ICU Progress Note      ASSESSMENT:  · Acute Hypoxic Respiratory Failure due to cardiac arrest - stable on mechanical ventilation   · Cardiac Arrest due to hyperkalemia  · Circulatory Shock due to cardiac arrest  · Possible Hypoxic Encephalopathy - has completed targeted temperature management  · Possible Aspiration Pneumonia - bilateral air space disease on chest xray  · Acute Kidney Injury - CRRT per Nephrology  · Hyperglycemia - controlled  · Cirrhosis  · GERD     PLAN:  · Mechanical Ventilation with Assist Control   · Propofol infusion for sedation using RASS   · Fentanyl as needed for analgesia  · CRRT per Nephrology  · Levophed for shock  · Unasyn for possible aspiration pneumonia  · Targeted Temperature Management for possible hypoxic encephalopathy can now be discontinued  · Sliding Scale Insulin to control glucose  · Protonix for GERD and GI prophylaxis  · DVT Prophylaxis with Lower Extremity Sequential Compression Device       EVENTS OF LAST 24 HOURS:   Intubated and on mechanical ventilation. She is on CRRT. She is on Levophed for shock. CHIEF COMPLAINT:  Unable to obtain due to intubation and sedation. HISTORY:  Unable to obtain due to intubation and sedation. REVIEW OF SYMPTOMS:  Unable to obtain due to intubation and sedation. MECHANICAL VENTILATION:  Intubated 2/15/19  Vent Mode: AC/VC Rate Set: 12 bmp/Vt Ordered: 500 mL/ /FiO2 : 50 %    ABG:   Recent Labs      02/16/19   0615  02/17/19   0629   PHART  7.397  7. 411   NSB3FMO  41.4  38.9   PO2ART  164.0*  133.0*         IV:   prismaSATE BGK 4/2.5 1,000 mL/hr (02/17/19 2305)    prismaSol BGK 4/2.5 1,000 mL/hr (02/17/19 2321)    dextrose      norepinephrine 0.57 mcg/kg/min (02/17/19 2340)    sodium chloride 100 mL/hr at 02/17/19 1953    propofol Stopped (02/17/19 1647)    fentaNYL (SUBLIMAZE) infusion Stopped (02/17/19 1530)       Intake/Output Summary (Last 24 hours) at 02/17/19 2350  Last data filed at 02/17/19 2300   Gross per 24 hour   Intake             4487 ml   Output             4387 ml   Net              100 ml       MEDICATIONS:  Scheduled Meds:   insulin lispro  0-12 Units Subcutaneous Q4H    sodium chloride flush  10 mL Intravenous 2 times per day    pantoprazole  40 mg Intravenous BID    ampicillin-sulbactam  3 g Intravenous Q12H     PRN Meds:  magnesium sulfate, dextrose, glucagon (rDNA), dextrose, calcium gluconate IVPB **OR** calcium gluconate IVPB **OR** calcium gluconate IVPB **OR** calcium gluconate IVPB, sodium phosphate IVPB **OR** sodium phosphate IVPB **OR** sodium phosphate IVPB **OR** sodium phosphate IVPB, albuterol sulfate HFA, sodium chloride flush, magnesium hydroxide, ondansetron, LORazepam **OR** LORazepam **OR** LORazepam **OR** LORazepam **OR** LORazepam **OR** LORazepam **OR** LORazepam **OR** LORazepam, fentanNYL, heparin (porcine), midazolam      PHYSICAL EXAM:  Vital Signs: BP (!) 69/53   Pulse 84   Temp 92.1 °F (33.4 °C) (Core)   Resp 11   Wt 196 lb 6.9 oz (89.1 kg)   SpO2 100%   BMI 37.12 kg/m²      Gen: Intubated and on mechanical ventilation. ENT:   Endotracheal tube is in place. Neck:   Trachea is midline. No JVD. Resp:   No accessory muscle use. Clear lungs. CV:   PMI is normal. No murmur or gallop. GI:   Soft and not distended. No tenderness. Skin:   No joint swelling or tenderness in RUE, LUE, RLE, or LLE. No edema. M/S:  Mild edema. Neuro:  Sedated. No tremor. LAB RESULTS:  CBC:   Recent Labs      02/15/19   1435   02/17/19   0023  02/17/19   0629  02/17/19   1750   WBC  9.9   --    --   11.5*   --    HGB  9.4*   < >  9.9*  10.1*  9.5*   HCT  29.0*   < >  30.7*  31.3*  29.6*   MCV  82.7   --    --   80.7   --    PLT  220   --    --   see below   --     < > = values in this interval not displayed.      CHEMISTRY:   Recent Labs      02/17/19   0629  02/17/19   1215  02/17/19   1750   NA  128*  133*  130*   K

## 2019-02-18 NOTE — PLAN OF CARE
Problem: Risk for Impaired Skin Integrity  Goal: Tissue integrity - skin and mucous membranes  Structural intactness and normal physiological function of skin and  mucous membranes. Outcome: Ongoing  At risk for skin breakdown. See Bryce scale. Remains on bedrest.  Unable to position self in bed. Turn and reposition every two hours and as needed. Heels elevated off bed. Protective barrier placed as needed. Patient kept clean and dry. Pillows used for positioning. Will continue to monitor for skin breakdown. Problem: Falls - Risk of:  Goal: Absence of physical injury  Absence of physical injury   Outcome: Ongoing  Patient placed on fall Precautions per [facility] Fall Risk Assessment Scale (Score> 45). Fall risk armband on, yellow blanket placed on foot of bed, S.A.F.E. sign or orange light displayed at door. Bed in locked and low position, bed alarm armed and audible, call light and bedside table in reach. Patient acknowledges the need to call before getting out of bed. Q2 monitoring, and toileting performed. Problem: OXYGENATION/RESPIRATORY FUNCTION  Goal: Patient will maintain patent airway  Ett remains in place. Vent support for breathing. Problem: MECHANICAL VENTILATION  Goal: Patient will maintain patent airway  Ett remains in place. Vent support for breathing.

## 2019-02-18 NOTE — PROGRESS NOTES
[facility] ([facility])    Hyperkalemia    PEA (Pulseless electrical activity) ([address]  Resolved Problems:    * No resolved hospital problems. *        Medications Reviewed by me   insulin lispro  0-12 Units Subcutaneous Q4H    sodium chloride flush  10 mL Intravenous 2 times per day    pantoprazole  40 mg Intravenous BID    ampicillin-sulbactam  3 g Intravenous Q12H      prismaSATE BGK 4/2.5 1,000 mL/hr (02/18/19 0447)    prismaSol BGK 4/2.5 1,000 mL/hr (02/18/19 0447)    dextrose      norepinephrine 0.87 mcg/kg/min (02/18/19 0618)    sodium chloride 100 mL/hr at 02/18/19 0515    propofol Stopped (02/17/19 1647)    fentaNYL (SUBLIMAZE) infusion Stopped (02/17/19 1530)       Data Review. Labs reviewed by me       CBC:   Recent Labs      02/15/19   1435   02/17/19   0629  02/17/19   1750  02/18/19   0000  02/18/19   0750   WBC  9.9   --   11.5*   --    --    --    HGB  9.4*   < >  10.1*  9.5*  9.3*  9.8*   HCT  29.0*   < >  31.3*  29.6*  29.6*  31.4*   MCV  82.7   --   80.7   --    --    --    PLT  220   --   see below   --    --    --     < > = values in this interval not displayed.      BMP:   Recent Labs      02/17/19   1750  02/18/19   0000  02/18/19   0515   NA  130*  133*  134*   K  3.8  4.4  4.8   CL  94*  100  101   CO2  25  23  21   PHOS  3.2  3.0  2.7   BUN  5*  5*  5*   CREATININE  0.8  0.8  0.8     Magnesium:   Lab Results   Component Value Date    MG 2.10 02/18/2019    MG 1.90 02/18/2019    MG 2.00 02/17/2019     Lab Results   Component Value Date    CREATININE 0.8 02/18/2019     @CMP: @LABRCNT   CMP:  Recent Labs      02/17/19   1750  02/18/19   0000  02/18/19   0515   NA  130*  133*  134*   K  3.8  4.4  4.8   CL  94*  100  101   CO2  25  23  21   BUN  5*  5*  5*   CREATININE  0.8  0.8  0.8   CALCIUM  7.5*  6.5*  7.4*   GLUCOSE  156*  116*  132*           CBC:  Recent Labs      02/15/19   1435   02/17/19   0629  02/17/19   1750  02/18/19   0000  02/18/19   0750   WBC  9.9   --   11.5*   --    --    --

## 2019-02-18 NOTE — PROGRESS NOTES
11/28/2017    GLUCOSEU Negative 11/28/2017       Radiology:  XR CHEST PORTABLE   Final Result   Continued improvement. XR CHEST PORTABLE   Final Result   Mild improvement. XR CHEST PORTABLE   Final Result   Endotracheal tube and right internal jugular central venous catheter are in   place as above. Suspected central pulmonary vascular congestion. XR CHEST PORTABLE   Final Result   1. Recommend retracting endotracheal tube by 3 cm. 2. Hazy opacity in the medial right heart border may reflect partial   atelectasis of the right middle lobe versus an infectious/inflammatory   process. Continued radiographic follow-up is recommended. Results were called by Dr. Mitchell Macdonald to Dr. Martin Londono on 2/15/2019 at   15:30.         CT HEAD [address]    (Results Pending)           Assessment/Plan:    Active Hospital Problems    Diagnosis Date Noted    Torsades de pointes ([address] [I47.2]     Hyperkalemia [E87.5]     PEA (Pulseless electrical activity) ([address] [I46.9]     Bradycardia [R00.1] 02/15/2019    Shock circulatory ([address] [R57.9]     Acute respiratory failure with hypoxia ([address] [J96.01]     Hypoxic encephalopathy ([address] [G93.1]        #1. Cardiac arrest: Secondary to hyperkalemia . torades de pointes  on admission. . Status post resuscitation with successful ROSC. Echo -wnl. cards consulted. May need stress test down the hima if mentation improves. Of sedation now. Neuro consulted.      #2, hyperkalemia: sec to K supl/spironolactone. Nephrology consulted. On CRRT . Improved .      #3. Acute kidney injury: on CRRT      #4. Acute respiratory failure: Secondary to cardiac arrest. Intubated. Pulmonology assisting vent management.      #5. Shock: Secondary to cardiac event. . Low suspicion for infection. Chest x-ray with atelectasis. Low pro-calcitonin. Blood cultures -ngtd. Off levo. On antibiotics per critical care     #6. Alcoholic cirrhosis  : Cont  CIWA protocol. mvi   .      #7.  H/o

## 2019-02-18 NOTE — PROGRESS NOTES
CRRT Machine clotted at approx 2315. Restarted at 2345. Patient currently tolerating well. Midnight labs collected and resulted. Will replace calcium gluconate all other electrolytes within normal range. Sedation remains off, no change in mental status at this time. Levophed infusing titration for map of 65 to 75. Patient repositioned in bed. No further needs. Will continue to monitor.

## 2019-02-18 NOTE — PROGRESS NOTES
with a RVSP estimation of 26mmHg. Assessment:    1. Torsades de pointes ([address]    2. PEA (Pulseless electrical activity) ([address]    3. Hyperkalemia     4. Bradycardia due to hyperkalemia  5. Chronic diastolic HF  6. 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 This time excludes any time that may have been spent performing procedures. This includes but not limited to vital sign monitoring, telemetry monitoring, continuous pulse oximety, IV medication, clinical response to the IV medications, documentation time , consultation time, interpretation of lab data, review of nursing notes and old record review.          Maddy Cardona MD, 2/18/2019 2:10 PM

## 2019-02-19 LAB
ANION GAP SERPL CALCULATED.3IONS-SCNC: 10 MMOL/L (ref 3–16)
ANION GAP SERPL CALCULATED.3IONS-SCNC: 10 MMOL/L (ref 3–16)
ANION GAP SERPL CALCULATED.3IONS-SCNC: 9 MMOL/L (ref 3–16)
ANION GAP SERPL CALCULATED.3IONS-SCNC: 9 MMOL/L (ref 3–16)
BUN BLDV-MCNC: 10 MG/DL (ref 7–20)
BUN BLDV-MCNC: 11 MG/DL (ref 7–20)
BUN BLDV-MCNC: 8 MG/DL (ref 7–20)
BUN BLDV-MCNC: 9 MG/DL (ref 7–20)
CALCIUM IONIZED: 1.02 MMOL/L (ref 1.12–1.32)
CALCIUM IONIZED: 1.07 MMOL/L (ref 1.12–1.32)
CALCIUM SERPL-MCNC: 8.1 MG/DL (ref 8.3–10.6)
CALCIUM SERPL-MCNC: 8.1 MG/DL (ref 8.3–10.6)
CALCIUM SERPL-MCNC: 8.2 MG/DL (ref 8.3–10.6)
CALCIUM SERPL-MCNC: 8.5 MG/DL (ref 8.3–10.6)
CHLORIDE BLD-SCNC: 100 MMOL/L (ref 99–110)
CHLORIDE BLD-SCNC: 100 MMOL/L (ref 99–110)
CHLORIDE BLD-SCNC: 101 MMOL/L (ref 99–110)
CHLORIDE BLD-SCNC: 99 MMOL/L (ref 99–110)
CO2: 24 MMOL/L (ref 21–32)
CO2: 25 MMOL/L (ref 21–32)
CREAT SERPL-MCNC: 1 MG/DL (ref 0.6–1.2)
GFR AFRICAN AMERICAN: >60
GFR NON-AFRICAN AMERICAN: 56
GLUCOSE BLD-MCNC: 110 MG/DL (ref 70–99)
GLUCOSE BLD-MCNC: 128 MG/DL (ref 70–99)
GLUCOSE BLD-MCNC: 129 MG/DL (ref 70–99)
GLUCOSE BLD-MCNC: 131 MG/DL (ref 70–99)
GLUCOSE BLD-MCNC: 134 MG/DL (ref 70–99)
GLUCOSE BLD-MCNC: 137 MG/DL (ref 70–99)
GLUCOSE BLD-MCNC: 138 MG/DL (ref 70–99)
GLUCOSE BLD-MCNC: 149 MG/DL (ref 70–99)
GLUCOSE BLD-MCNC: 97 MG/DL (ref 70–99)
HCT VFR BLD CALC: 24.9 % (ref 36–48)
HEMOGLOBIN: 7.9 G/DL (ref 12–16)
MAGNESIUM: 2.4 MG/DL (ref 1.8–2.4)
MAGNESIUM: 2.4 MG/DL (ref 1.8–2.4)
MAGNESIUM: 2.5 MG/DL (ref 1.8–2.4)
MAGNESIUM: 2.5 MG/DL (ref 1.8–2.4)
PERFORMED ON: ABNORMAL
PERFORMED ON: NORMAL
PH VENOUS: 7.46 (ref 7.35–7.45)
PH VENOUS: 7.51 (ref 7.35–7.45)
PH VENOUS: 7.55 (ref 7.35–7.45)
PH VENOUS: 7.56 (ref 7.35–7.45)
PHOSPHORUS: 2.3 MG/DL (ref 2.5–4.9)
PHOSPHORUS: 2.7 MG/DL (ref 2.5–4.9)
PHOSPHORUS: 2.8 MG/DL (ref 2.5–4.9)
PHOSPHORUS: 2.8 MG/DL (ref 2.5–4.9)
POTASSIUM SERPL-SCNC: 4.6 MMOL/L (ref 3.5–5.1)
POTASSIUM SERPL-SCNC: 4.7 MMOL/L (ref 3.5–5.1)
POTASSIUM SERPL-SCNC: 4.7 MMOL/L (ref 3.5–5.1)
POTASSIUM SERPL-SCNC: 4.8 MMOL/L (ref 3.5–5.1)
SODIUM BLD-SCNC: 133 MMOL/L (ref 136–145)
SODIUM BLD-SCNC: 133 MMOL/L (ref 136–145)
SODIUM BLD-SCNC: 134 MMOL/L (ref 136–145)
SODIUM BLD-SCNC: 135 MMOL/L (ref 136–145)

## 2019-02-19 PROCEDURE — 94770 HC ETCO2 MONITOR DAILY: CPT

## 2019-02-19 PROCEDURE — 2500000003 HC RX 250 WO HCPCS: Performed by: INTERNAL MEDICINE

## 2019-02-19 PROCEDURE — 94750 HC PULMONARY COMPLIANCE STUDY: CPT

## 2019-02-19 PROCEDURE — 82330 ASSAY OF CALCIUM: CPT

## 2019-02-19 PROCEDURE — 6360000002 HC RX W HCPCS: Performed by: INTERNAL MEDICINE

## 2019-02-19 PROCEDURE — 94003 VENT MGMT INPAT SUBQ DAY: CPT

## 2019-02-19 PROCEDURE — 85014 HEMATOCRIT: CPT

## 2019-02-19 PROCEDURE — 99231 SBSQ HOSP IP/OBS SF/LOW 25: CPT | Performed by: INTERNAL MEDICINE

## 2019-02-19 PROCEDURE — 94640 AIRWAY INHALATION TREATMENT: CPT

## 2019-02-19 PROCEDURE — 85018 HEMOGLOBIN: CPT

## 2019-02-19 PROCEDURE — 99291 CRITICAL CARE FIRST HOUR: CPT | Performed by: INTERNAL MEDICINE

## 2019-02-19 PROCEDURE — 2580000003 HC RX 258: Performed by: INTERNAL MEDICINE

## 2019-02-19 PROCEDURE — 80048 BASIC METABOLIC PNL TOTAL CA: CPT

## 2019-02-19 PROCEDURE — 2000000000 HC ICU R&B

## 2019-02-19 PROCEDURE — 95822 EEG COMA OR SLEEP ONLY: CPT | Performed by: PSYCHIATRY & NEUROLOGY

## 2019-02-19 PROCEDURE — 83735 ASSAY OF MAGNESIUM: CPT

## 2019-02-19 PROCEDURE — 99233 SBSQ HOSP IP/OBS HIGH 50: CPT | Performed by: PSYCHIATRY & NEUROLOGY

## 2019-02-19 PROCEDURE — 6370000000 HC RX 637 (ALT 250 FOR IP): Performed by: INTERNAL MEDICINE

## 2019-02-19 PROCEDURE — 84100 ASSAY OF PHOSPHORUS: CPT

## 2019-02-19 PROCEDURE — C9113 INJ PANTOPRAZOLE SODIUM, VIA: HCPCS | Performed by: INTERNAL MEDICINE

## 2019-02-19 PROCEDURE — P9047 ALBUMIN (HUMAN), 25%, 50ML: HCPCS | Performed by: INTERNAL MEDICINE

## 2019-02-19 PROCEDURE — 2700000000 HC OXYGEN THERAPY PER DAY

## 2019-02-19 PROCEDURE — 95819 EEG AWAKE AND ASLEEP: CPT

## 2019-02-19 RX ORDER — IPRATROPIUM BROMIDE AND ALBUTEROL SULFATE 2.5; .5 MG/3ML; MG/3ML
1 SOLUTION RESPIRATORY (INHALATION)
Status: DISCONTINUED | OUTPATIENT
Start: 2019-02-19 | End: 2019-02-19

## 2019-02-19 RX ORDER — ALBUTEROL SULFATE 90 UG/1
2 AEROSOL, METERED RESPIRATORY (INHALATION) EVERY 4 HOURS PRN
Status: DISCONTINUED | OUTPATIENT
Start: 2019-02-19 | End: 2019-02-25 | Stop reason: HOSPADM

## 2019-02-19 RX ORDER — ALBUMIN (HUMAN) 12.5 G/50ML
12.5 SOLUTION INTRAVENOUS EVERY 8 HOURS
Status: COMPLETED | OUTPATIENT
Start: 2019-02-19 | End: 2019-02-20

## 2019-02-19 RX ORDER — ALBUTEROL SULFATE 90 UG/1
6 AEROSOL, METERED RESPIRATORY (INHALATION) 4 TIMES DAILY
Status: DISCONTINUED | OUTPATIENT
Start: 2019-02-19 | End: 2019-02-25

## 2019-02-19 RX ORDER — ALBUTEROL SULFATE 90 UG/1
2 AEROSOL, METERED RESPIRATORY (INHALATION) EVERY 4 HOURS PRN
Status: DISCONTINUED | OUTPATIENT
Start: 2019-02-19 | End: 2019-02-19

## 2019-02-19 RX ORDER — ALBUTEROL SULFATE 90 UG/1
2 AEROSOL, METERED RESPIRATORY (INHALATION) 4 TIMES DAILY
Status: DISCONTINUED | OUTPATIENT
Start: 2019-02-19 | End: 2019-02-19

## 2019-02-19 RX ADMIN — CALCIUM GLUCONATE 1 G: 98 INJECTION, SOLUTION INTRAVENOUS at 02:03

## 2019-02-19 RX ADMIN — CALCIUM GLUCONATE 1 G: 98 INJECTION, SOLUTION INTRAVENOUS at 06:54

## 2019-02-19 RX ADMIN — Medication 10 ML: at 09:16

## 2019-02-19 RX ADMIN — CALCIUM GLUCONATE 1 G: 98 INJECTION, SOLUTION INTRAVENOUS at 14:31

## 2019-02-19 RX ADMIN — Medication 1000 ML/HR: at 15:12

## 2019-02-19 RX ADMIN — PANTOPRAZOLE SODIUM 40 MG: 40 INJECTION, POWDER, FOR SOLUTION INTRAVENOUS at 09:16

## 2019-02-19 RX ADMIN — Medication 10 ML: at 20:07

## 2019-02-19 RX ADMIN — Medication 1000 ML/HR: at 00:51

## 2019-02-19 RX ADMIN — PANTOPRAZOLE SODIUM 40 MG: 40 INJECTION, POWDER, FOR SOLUTION INTRAVENOUS at 20:06

## 2019-02-19 RX ADMIN — Medication 1000 ML/HR: at 06:17

## 2019-02-19 RX ADMIN — ALBUMIN (HUMAN) 12.5 G: 0.25 INJECTION, SOLUTION INTRAVENOUS at 00:49

## 2019-02-19 RX ADMIN — Medication 0.6 MCG/KG/MIN: at 20:45

## 2019-02-19 RX ADMIN — Medication 0.6 MCG/KG/MIN: at 15:13

## 2019-02-19 RX ADMIN — SODIUM PHOSPHATE, MONOBASIC, MONOHYDRATE 6 MMOL: 276; 142 INJECTION, SOLUTION INTRAVENOUS at 06:55

## 2019-02-19 RX ADMIN — HEPARIN SODIUM: 1000 INJECTION INTRAVENOUS; SUBCUTANEOUS at 15:13

## 2019-02-19 RX ADMIN — ALBUMIN (HUMAN) 12.5 G: 0.25 INJECTION, SOLUTION INTRAVENOUS at 19:41

## 2019-02-19 RX ADMIN — SODIUM PHOSPHATE, MONOBASIC, MONOHYDRATE 15 MMOL: 276; 142 INJECTION, SOLUTION INTRAVENOUS at 10:35

## 2019-02-19 RX ADMIN — Medication 1000 ML/HR: at 10:53

## 2019-02-19 RX ADMIN — INSULIN LISPRO 2 UNITS: 100 INJECTION, SOLUTION INTRAVENOUS; SUBCUTANEOUS at 09:19

## 2019-02-19 RX ADMIN — Medication 1000 ML/HR: at 20:56

## 2019-02-19 RX ADMIN — SODIUM CHLORIDE 3 G: 900 INJECTION INTRAVENOUS at 09:14

## 2019-02-19 RX ADMIN — Medication 0.65 MCG/KG/MIN: at 05:21

## 2019-02-19 RX ADMIN — Medication 1000 ML/HR: at 15:13

## 2019-02-19 RX ADMIN — THIAMINE HYDROCHLORIDE 300 MG: 100 INJECTION, SOLUTION INTRAMUSCULAR; INTRAVENOUS at 12:55

## 2019-02-19 RX ADMIN — Medication 0.66 MCG/KG/MIN: at 00:36

## 2019-02-19 RX ADMIN — ALBUMIN (HUMAN) 12.5 G: 0.25 INJECTION, SOLUTION INTRAVENOUS at 10:41

## 2019-02-19 RX ADMIN — CALCIUM GLUCONATE 1 G: 98 INJECTION, SOLUTION INTRAVENOUS at 19:45

## 2019-02-19 RX ADMIN — Medication 2 PUFF: at 08:26

## 2019-02-19 RX ADMIN — HEPARIN SODIUM: 1000 INJECTION INTRAVENOUS; SUBCUTANEOUS at 05:15

## 2019-02-19 RX ADMIN — SODIUM CHLORIDE 3 G: 900 INJECTION INTRAVENOUS at 20:45

## 2019-02-19 RX ADMIN — Medication 0.65 MCG/KG/MIN: at 10:36

## 2019-02-19 RX ADMIN — Medication 6 PUFF: at 20:22

## 2019-02-19 ASSESSMENT — PULMONARY FUNCTION TESTS
PIF_VALUE: 21
PIF_VALUE: 14
PIF_VALUE: 14
PIF_VALUE: 17
PIF_VALUE: 19
PIF_VALUE: 16

## 2019-02-19 ASSESSMENT — PAIN SCALES - GENERAL
PAINLEVEL_OUTOF10: 0

## 2019-02-19 NOTE — PROGRESS NOTES
Palliative Care:     Call placed to son Stan Mail requesting family meeting tomorrow. Awaiting return call. Left message.

## 2019-02-19 NOTE — PLAN OF CARE
Problem: Risk for Impaired Skin Integrity  Goal: Tissue integrity - skin and mucous membranes  Structural intactness and normal physiological function of skin and  mucous membranes. Outcome: Ongoing  At risk for skin breakdown. See Bryce scale. Remains on bedrest.  Unable to position self in bed. Turn and reposition every two hours and as needed. Heels elevated off bed. Protective barrier placed as needed. Patient kept clean and dry. Pillows used for positioning. Will continue to monitor for skin breakdown. Problem: Falls - Risk of:  Goal: Will remain free from falls  Will remain free from falls   Outcome: Ongoing  Patient placed on fall Precautions per [facility] Fall Risk Assessment Scale (Score> 45). Fall risk armband on, yellow blanket placed on foot of bed, S.A.F.E. sign or orange light displayed at door. Bed in locked and low position, bed alarm armed and audible, call light and bedside table in reach. Patient acknowledges the need to call before getting out of bed. Q2 monitoring, and toileting performed. Problem: MECHANICAL VENTILATION  Goal: ET tube will be managed safely  Outcome: Ongoing  Patient tolerating vent at this time.

## 2019-02-19 NOTE — PROGRESS NOTES
Patient assessment completed, no acute changes. Mental status remains unchanged. Midnight labs resulted- Ionized Calcium 1.05 will receive 1 gram. No other replacement needed at this time. CRRT continues. Patient's son, Phuong Biswas, called and updated on current status. Encouraged to attend rounding tomorrow-states will be here tomorrow for updates from MD. No further needs at this time. Will continue to monitor.

## 2019-02-19 NOTE — PROGRESS NOTES
Units  0-12 Units Subcutaneous Q4H Tanna Salmon MD   2 Units at 02/19/19 0919    dextrose 50 % solution 12.5 g  12.5 g Intravenous PRN Tanna Salmon MD        glucagon (rDNA) injection 1 mg  1 mg Intramuscular PRN Tanna Salmon MD        dextrose 5 % solution  100 mL/hr Intravenous PRN Tanna Salmon MD        calcium gluconate 1 g in dextrose 5 % 100 mL IVPB  1 g Intravenous PRN Franklyn Brown MD   Stopped at 02/19/19 0754    Or    calcium gluconate 2 g in dextrose 5 % 100 mL IVPB  2 g Intravenous PRN Franklyn Brown MD   Stopped at 02/18/19 0253    Or    calcium gluconate 3 g in dextrose 5 % 100 mL IVPB  3 g Intravenous PRN Franklyn Brown MD   Stopped at 02/17/19 1741    Or    calcium gluconate 4 g in dextrose 5 % 100 mL IVPB  4 g Intravenous PRN Franklyn Brown MD        sodium phosphate 6 mmol in dextrose 5 % 250 mL IVPB  6 mmol Intravenous PRN Franklyn Brown MD   Stopped at 02/19/19 0855    Or    sodium phosphate 12 mmol in dextrose 5 % 250 mL IVPB  12 mmol Intravenous PRN Franklyn Brown MD   Stopped at 02/17/19 1931    Or    sodium phosphate 18 mmol in dextrose 5 % 500 mL IVPB  18 mmol Intravenous PRN Franklyn Brown MD        Or    sodium phosphate 24 mmol in dextrose 5 % 500 mL IVPB  24 mmol Intravenous PRN Franklyn Brown MD        norepinephrine (LEVOPHED) 16 mg in dextrose 5% 250 mL infusion  2 mcg/min Intravenous Continuous Tanna Salmon MD 51.8 mL/hr at 02/19/19 1036 0.65 mcg/kg/min at 02/19/19 1036    albuterol sulfate  (90 Base) MCG/ACT inhaler 2 puff  2 puff Inhalation Q6H PRN Reinier Yeboah MD   2 puff at 02/19/19 0826    sodium chloride flush 0.9 % injection 10 mL  10 mL Intravenous 2 times per day Reinier Yeboah MD   10 mL at 02/19/19 0916    sodium chloride flush 0.9 % injection 10 mL  10 mL Intravenous PRN Reinier Yeboah MD        magnesium hydroxide (MILK OF MAGNESIA) 400 MG/5ML suspension 30 mL  30 mL Oral Daily PRN Nehal Lee MD        ondansetron [facility]) injection 4 mg  4 mg Intravenous Q6H PRN Nehal Lee MD        LORazepam (ATIVAN) tablet 1 mg  1 mg Oral Q1H PRN Nehal Lee MD        Or    LORazepam (ATIVAN) injection 1 mg  1 mg Intravenous Q1H PRN Nehal Lee MD        Or    LORazepam (ATIVAN) tablet 2 mg  2 mg Oral Q1H PRN Nehal Lee MD        Or    LORazepam (ATIVAN) injection 2 mg  2 mg Intravenous Q1H PRN Nehal Lee MD        Or    LORazepam (ATIVAN) tablet 3 mg  3 mg Oral Q1H PRN Nehal Lee MD        Or    LORazepam (ATIVAN) injection 3 mg  3 mg Intravenous Q1H PRN Nehal Lee MD        Or    LORazepam (ATIVAN) tablet 4 mg  4 mg Oral Q1H PRN Nehal Lee MD        Or    LORazepam (ATIVAN) injection 4 mg  4 mg Intravenous Q1H PRN Nehal Lee MD        pantoprazole (PROTONIX) injection 40 mg  40 mg Intravenous BID Nehal Lee MD   40 mg at 02/19/19 0916    ampicillin-sulbactam (UNASYN) 3 g ivpb minibag  3 g Intravenous Q12H Claudeen Olmsted, MD   Stopped at 02/19/19 0944    fentaNYL (SUBLIMAZE) injection 50 mcg  50 mcg Intravenous Q1H PRN Claudeen Olmsted, MD        heparin (porcine) injection 2,700 Units  2,700 Units Intercatheter PRN Doloris Hodgkins, MD   2,700 Units at 02/16/19 0927    midazolam (VERSED) injection 2 mg  2 mg Intravenous Q1H PRN Claudeen Olmsted, MD   2 mg at 02/15/19 2206    propofol 1000 MG/100ML injection  10 mcg/kg/min Intravenous Titrated Claudeen Olmsted, MD   Stopped at 02/17/19 1647    fentaNYL (SUBLIMAZE) 1,000 mcg in sodium chloride 0.9 % 100 mL infusion  0.5 mcg/kg/hr Intravenous Continuous Ryan Nino MD   Stopped at 02/17/19 1530     Allergies   Allergen Reactions    Codeine Rash     family history is not on file. reports that she has been smoking Cigarettes. She has a 17.50 pack-year smoking history.  She has never used smokeless tobacco. She reports that she uses drugs,

## 2019-02-19 NOTE — PROGRESS NOTES
Unknown MD Miah Tipton MD Corky Saris, MD                                  Office: [phone]                 Fax: [phone]          [employer]                     NEPHROLOGY IN PATIENT  ICU PROGRESS NOTE:     PATIENT NAME: Sabine Rene  : 1955  MRN: [id number]        Subjective: Intubated on ventilator. Still hypotensive. On pressor support. Anuric  [place] catheter  On CRRT. Assessment and Plan   Acute kidney injury-severe-anuric-no improvement. Requiring CRRT, lab status better but would need to continue. Keep I=O. If filter clots, will hold off further CRRT for today and reevaluate tomorrow. Hyperkalemia-severe-improved  Hyponatremia- follow for now. Hypocalcemia-2.5meq Ca bath  Severe hypotension on pressor support. Will give albumin again today. Low Phosp-replace  History of chronic liver disease. Ascites  Respiratory failure intubated on ventilator. H/O Cardiac Arrest due to Hyperkalemia  Possible Aspiration Pneumonia  Currently has right IJ central line. Will discuss with IR about possible need for tunneled cath on the left due to prolong ATN episode. Critically ill. Discussed with ICU nurse. EXAM  Vitals:    19 0845   BP:    Pulse: 112   Resp: 18   Temp:    SpO2: 97%       Intake/Output Summary (Last 24 hours) at 19 0909  Last data filed at 19 0800   Gross per 24 hour   Intake             2340 ml   Output             2244 ml   Net               96 ml        Ill Appearing. intubated and on ventilator. Sedated on vent. hypotension on pressor support. External exam of the ears and nose are normal  HENT: exam is normal  Eyes: Pupils are equal, round,  CVS.  Heart sounds are normal.Palpation of the heart is normal. No murmurs. No pericardial rub. RS. Bilateral Basal rales. PA soft , bowel sounds are normal no distension and no tenderness to palpation.   Skin No rash , No palpable nodules  Musculoskeletal: 1+ 8  9   CREATININE  0.8  1.0  1.0   CALCIUM  7.8*  8.1*  8.1*   GLUCOSE  168*  137*  128*           CBC:  Recent Labs      02/17/19   0629   02/18/19   0000  02/18/19   0750  02/19/19   0536   WBC  11.5*   --    --    --    --    RBC  3.88*   --    --    --    --    HGB  10.1*   < >  9.3*  9.8*  7.9*   HCT  31.3*   < >  29.6*  31.4*  24.9*   PLT  see below   --    --    --    --    MCV  80.7   --    --    --    --    MCH  25.9*   --    --    --    --    MCHC  32.1   --    --    --    --    RDW  19.9*   --    --    --    --     < > = values in this interval not displayed. BMP:  Recent Labs      02/18/19   1810  02/18/19   2345  02/19/19   0536   NA  133*  133*  134*   K  4.5  4.7  4.7   CL  98*  99  101   CO2  25  24  24   BUN  6*  8  9   CREATININE  0.8  1.0  1.0   CALCIUM  7.8*  8.1*  8.1*   GLUCOSE  168*  137*  128*      ABG:  Recent Labs      02/17/19   0629   PHART  7.411   [id number]  38.9   PO2ART  133.0*   [id number]  24.7   [id number]  99.6   BEART  0.1         Arterial Blood Gasses  No results for input(s): PH, PCO2, PO2 in the last 72 hours. Invalid input(s): Alexis Lips    UA:  No results for input(s): NITRITE, COLORU, PHUR, LABCAST, WBCUA, RBCUA, MUCUS, TRICHOMONAS, YEAST, BACTERIA, CLARITYU, SPECGRAV, LEUKOCYTESUR, UROBILINOGEN, BILIRUBINUR, BLOODU, GLUCOSEU, AMORPHOUS in the last 72 hours. Invalid input(s): Jax De La Garza    LIVER PROFILE:   Recent Labs      02/17/19   0629   AST  55*   ALT  31   BILIDIR  1.6*   BILITOT  2.7*   ALKPHOS  149*     PT/INR:    Lab Results   Component Value Date    PROTIME 16.9 02/15/2019    PROTIME 17.0 11/27/2017    PROTIME 17.2 03/09/2017    INR 1.48 02/15/2019    INR 1.50 11/27/2017    INR 1.52 03/09/2017     PTT:    Lab Results   Component Value Date    APTT 34.3 11/27/2017    APTT 33.5 03/09/2017    APTT 30.8 12/23/2016     MITA:    Lab Results   Component Value Date    MITA Negative 12/24/2016           RADIOLOGY:        Imaging Results.   Chest X Ray reviewed

## 2019-02-19 NOTE — SIGNIFICANT EVENT
Code blue was called for bradycardia, hypotension and hypoxia, patient was repositioned befor ethe episode, HR improved with iv atropine  -ABG with respiratory acidosis with ph of 7.16  Placed on vent with 100 % oxygen  On exam pupil sluggish to respond, more slow on right, lungs with bronchospasm  -ordered breathing treatment

## 2019-02-19 NOTE — PROGRESS NOTES
02/19/19 0826   [facility] Patient Data   Plateau Pressure 19 [id number]   Static Compliance 38.7 mL/cmH2O   Dynamic Compliance 33.8 mL/cmH2O

## 2019-02-20 ENCOUNTER — APPOINTMENT (OUTPATIENT)
Dept: CT IMAGING | Age: 64
DRG: 640 | End: 2019-02-20
Payer: MEDICARE

## 2019-02-20 LAB
ABO/RH: NORMAL
ANION GAP SERPL CALCULATED.3IONS-SCNC: 7 MMOL/L (ref 3–16)
ANION GAP SERPL CALCULATED.3IONS-SCNC: 8 MMOL/L (ref 3–16)
ANION GAP SERPL CALCULATED.3IONS-SCNC: 8 MMOL/L (ref 3–16)
ANTIBODY SCREEN: NORMAL
BASE EXCESS ARTERIAL: 0 (ref -3–3)
BLOOD BANK DISPENSE STATUS: NORMAL
BLOOD BANK PRODUCT CODE: NORMAL
BLOOD CULTURE, ROUTINE: NORMAL
BPU ID: NORMAL
BUN BLDV-MCNC: 12 MG/DL (ref 7–20)
BUN BLDV-MCNC: 13 MG/DL (ref 7–20)
BUN BLDV-MCNC: 13 MG/DL (ref 7–20)
CALCIUM IONIZED: 1.11 MMOL/L (ref 1.12–1.32)
CALCIUM IONIZED: 1.14 MMOL/L (ref 1.12–1.32)
CALCIUM SERPL-MCNC: 8.6 MG/DL (ref 8.3–10.6)
CALCIUM SERPL-MCNC: 8.7 MG/DL (ref 8.3–10.6)
CALCIUM SERPL-MCNC: 8.9 MG/DL (ref 8.3–10.6)
CHLORIDE BLD-SCNC: 100 MMOL/L (ref 99–110)
CO2: 24 MMOL/L (ref 21–32)
CO2: 26 MMOL/L (ref 21–32)
CO2: 27 MMOL/L (ref 21–32)
CREAT SERPL-MCNC: 0.8 MG/DL (ref 0.6–1.2)
CREAT SERPL-MCNC: 0.9 MG/DL (ref 0.6–1.2)
CREAT SERPL-MCNC: 1 MG/DL (ref 0.6–1.2)
CULTURE, BLOOD 2: NORMAL
DESCRIPTION BLOOD BANK: NORMAL
EKG ATRIAL RATE: 118 BPM
EKG DIAGNOSIS: NORMAL
EKG P AXIS: 47 DEGREES
EKG P-R INTERVAL: 192 MS
EKG Q-T INTERVAL: 316 MS
EKG QRS DURATION: 74 MS
EKG QTC CALCULATION (BAZETT): 442 MS
EKG R AXIS: -25 DEGREES
EKG T AXIS: 68 DEGREES
EKG VENTRICULAR RATE: 118 BPM
GFR AFRICAN AMERICAN: >60
GFR NON-AFRICAN AMERICAN: 56
GFR NON-AFRICAN AMERICAN: >60
GFR NON-AFRICAN AMERICAN: >60
GLUCOSE BLD-MCNC: 102 MG/DL (ref 70–99)
GLUCOSE BLD-MCNC: 113 MG/DL (ref 70–99)
GLUCOSE BLD-MCNC: 115 MG/DL (ref 70–99)
GLUCOSE BLD-MCNC: 118 MG/DL (ref 70–99)
GLUCOSE BLD-MCNC: 121 MG/DL (ref 70–99)
GLUCOSE BLD-MCNC: 123 MG/DL (ref 70–99)
GLUCOSE BLD-MCNC: 128 MG/DL (ref 70–99)
HCO3 ARTERIAL: 24.1 MMOL/L (ref 21–29)
HCT VFR BLD CALC: 20.8 % (ref 36–48)
HCT VFR BLD CALC: 24.2 % (ref 36–48)
HEMOGLOBIN: 6.8 G/DL (ref 12–16)
HEMOGLOBIN: 8 G/DL (ref 12–16)
MAGNESIUM: 2.5 MG/DL (ref 1.8–2.4)
MAGNESIUM: 2.6 MG/DL (ref 1.8–2.4)
MAGNESIUM: 2.6 MG/DL (ref 1.8–2.4)
O2 SAT, ARTERIAL: 98 % (ref 93–100)
PCO2 ARTERIAL: 33.9 MM HG (ref 35–45)
PERFORMED ON: ABNORMAL
PH ARTERIAL: 7.46 (ref 7.35–7.45)
PH VENOUS: 7.43 (ref 7.35–7.45)
PH VENOUS: 7.56 (ref 7.35–7.45)
PHOSPHORUS: 2.1 MG/DL (ref 2.5–4.9)
PHOSPHORUS: 2.1 MG/DL (ref 2.5–4.9)
PHOSPHORUS: 2.6 MG/DL (ref 2.5–4.9)
PO2 ARTERIAL: 97.2 MM HG (ref 75–108)
POC SAMPLE TYPE: ABNORMAL
POTASSIUM SERPL-SCNC: 4.5 MMOL/L (ref 3.5–5.1)
POTASSIUM SERPL-SCNC: 4.6 MMOL/L (ref 3.5–5.1)
POTASSIUM SERPL-SCNC: 4.9 MMOL/L (ref 3.5–5.1)
SODIUM BLD-SCNC: 132 MMOL/L (ref 136–145)
SODIUM BLD-SCNC: 133 MMOL/L (ref 136–145)
SODIUM BLD-SCNC: 135 MMOL/L (ref 136–145)
TCO2 ARTERIAL: 25 MMOL/L

## 2019-02-20 PROCEDURE — 2580000003 HC RX 258: Performed by: INTERNAL MEDICINE

## 2019-02-20 PROCEDURE — 82330 ASSAY OF CALCIUM: CPT

## 2019-02-20 PROCEDURE — 83735 ASSAY OF MAGNESIUM: CPT

## 2019-02-20 PROCEDURE — 93010 ELECTROCARDIOGRAM REPORT: CPT | Performed by: INTERNAL MEDICINE

## 2019-02-20 PROCEDURE — 6360000002 HC RX W HCPCS: Performed by: INTERNAL MEDICINE

## 2019-02-20 PROCEDURE — 80048 BASIC METABOLIC PNL TOTAL CA: CPT

## 2019-02-20 PROCEDURE — C9113 INJ PANTOPRAZOLE SODIUM, VIA: HCPCS | Performed by: INTERNAL MEDICINE

## 2019-02-20 PROCEDURE — 94640 AIRWAY INHALATION TREATMENT: CPT

## 2019-02-20 PROCEDURE — P9040 RBC LEUKOREDUCED IRRADIATED: HCPCS

## 2019-02-20 PROCEDURE — 70450 CT HEAD/BRAIN W/O DYE: CPT

## 2019-02-20 PROCEDURE — 87205 SMEAR GRAM STAIN: CPT

## 2019-02-20 PROCEDURE — 99232 SBSQ HOSP IP/OBS MODERATE 35: CPT | Performed by: PSYCHIATRY & NEUROLOGY

## 2019-02-20 PROCEDURE — 87077 CULTURE AEROBIC IDENTIFY: CPT

## 2019-02-20 PROCEDURE — 2000000000 HC ICU R&B

## 2019-02-20 PROCEDURE — 94003 VENT MGMT INPAT SUBQ DAY: CPT

## 2019-02-20 PROCEDURE — 2700000000 HC OXYGEN THERAPY PER DAY

## 2019-02-20 PROCEDURE — 36592 COLLECT BLOOD FROM PICC: CPT

## 2019-02-20 PROCEDURE — P9047 ALBUMIN (HUMAN), 25%, 50ML: HCPCS | Performed by: INTERNAL MEDICINE

## 2019-02-20 PROCEDURE — 86901 BLOOD TYPING SEROLOGIC RH(D): CPT

## 2019-02-20 PROCEDURE — 82803 BLOOD GASES ANY COMBINATION: CPT

## 2019-02-20 PROCEDURE — 87186 SC STD MICRODIL/AGAR DIL: CPT

## 2019-02-20 PROCEDURE — 94762 N-INVAS EAR/PLS OXIMTRY CONT: CPT

## 2019-02-20 PROCEDURE — 6370000000 HC RX 637 (ALT 250 FOR IP): Performed by: INTERNAL MEDICINE

## 2019-02-20 PROCEDURE — 85014 HEMATOCRIT: CPT

## 2019-02-20 PROCEDURE — 82947 ASSAY GLUCOSE BLOOD QUANT: CPT

## 2019-02-20 PROCEDURE — 86850 RBC ANTIBODY SCREEN: CPT

## 2019-02-20 PROCEDURE — 2500000003 HC RX 250 WO HCPCS: Performed by: INTERNAL MEDICINE

## 2019-02-20 PROCEDURE — 85018 HEMOGLOBIN: CPT

## 2019-02-20 PROCEDURE — 94770 HC ETCO2 MONITOR DAILY: CPT

## 2019-02-20 PROCEDURE — 84100 ASSAY OF PHOSPHORUS: CPT

## 2019-02-20 PROCEDURE — 86900 BLOOD TYPING SEROLOGIC ABO: CPT

## 2019-02-20 PROCEDURE — 36415 COLL VENOUS BLD VENIPUNCTURE: CPT

## 2019-02-20 PROCEDURE — 87070 CULTURE OTHR SPECIMN AEROBIC: CPT

## 2019-02-20 PROCEDURE — 99291 CRITICAL CARE FIRST HOUR: CPT | Performed by: INTERNAL MEDICINE

## 2019-02-20 PROCEDURE — 93005 ELECTROCARDIOGRAM TRACING: CPT | Performed by: INTERNAL MEDICINE

## 2019-02-20 PROCEDURE — 86923 COMPATIBILITY TEST ELECTRIC: CPT

## 2019-02-20 PROCEDURE — 36430 TRANSFUSION BLD/BLD COMPNT: CPT

## 2019-02-20 RX ORDER — SODIUM CHLORIDE 9 MG/ML
INJECTION, SOLUTION INTRAVENOUS
Status: DISPENSED
Start: 2019-02-20 | End: 2019-02-20

## 2019-02-20 RX ORDER — 0.9 % SODIUM CHLORIDE 0.9 %
250 INTRAVENOUS SOLUTION INTRAVENOUS ONCE
Status: COMPLETED | OUTPATIENT
Start: 2019-02-20 | End: 2019-02-20

## 2019-02-20 RX ADMIN — Medication 1000 ML/HR: at 01:48

## 2019-02-20 RX ADMIN — Medication 6 PUFF: at 19:40

## 2019-02-20 RX ADMIN — CALCIUM GLUCONATE 1 G: 98 INJECTION, SOLUTION INTRAVENOUS at 00:40

## 2019-02-20 RX ADMIN — Medication 6 PUFF: at 08:52

## 2019-02-20 RX ADMIN — ALBUMIN (HUMAN) 12.5 G: 0.25 INJECTION, SOLUTION INTRAVENOUS at 01:41

## 2019-02-20 RX ADMIN — PANTOPRAZOLE SODIUM 40 MG: 40 INJECTION, POWDER, FOR SOLUTION INTRAVENOUS at 08:39

## 2019-02-20 RX ADMIN — Medication 6 PUFF: at 08:51

## 2019-02-20 RX ADMIN — SODIUM CHLORIDE 3 G: 900 INJECTION INTRAVENOUS at 19:37

## 2019-02-20 RX ADMIN — Medication 10 ML: at 20:09

## 2019-02-20 RX ADMIN — SODIUM CHLORIDE 3 G: 900 INJECTION INTRAVENOUS at 08:39

## 2019-02-20 RX ADMIN — CALCIUM GLUCONATE 1 G: 98 INJECTION, SOLUTION INTRAVENOUS at 06:37

## 2019-02-20 RX ADMIN — Medication 6 PUFF: at 13:08

## 2019-02-20 RX ADMIN — Medication 10 ML: at 08:39

## 2019-02-20 RX ADMIN — Medication 6 PUFF: at 16:55

## 2019-02-20 RX ADMIN — SODIUM PHOSPHATE, MONOBASIC, MONOHYDRATE 6 MMOL: 276; 142 INJECTION, SOLUTION INTRAVENOUS at 08:15

## 2019-02-20 RX ADMIN — Medication 1000 ML/HR: at 06:59

## 2019-02-20 RX ADMIN — HEPARIN SODIUM: 1000 INJECTION INTRAVENOUS; SUBCUTANEOUS at 00:03

## 2019-02-20 RX ADMIN — PANTOPRAZOLE SODIUM 40 MG: 40 INJECTION, POWDER, FOR SOLUTION INTRAVENOUS at 20:09

## 2019-02-20 RX ADMIN — SODIUM CHLORIDE 250 ML: 9 INJECTION, SOLUTION INTRAVENOUS at 08:34

## 2019-02-20 RX ADMIN — THIAMINE HYDROCHLORIDE 300 MG: 100 INJECTION, SOLUTION INTRAMUSCULAR; INTRAVENOUS at 12:08

## 2019-02-20 RX ADMIN — Medication 0.38 MCG/KG/MIN: at 21:42

## 2019-02-20 ASSESSMENT — PULMONARY FUNCTION TESTS
PIF_VALUE: 15
PIF_VALUE: 24
PIF_VALUE: 17
PIF_VALUE: 30
PIF_VALUE: 16
PIF_VALUE: 23

## 2019-02-20 ASSESSMENT — PAIN SCALES - GENERAL
PAINLEVEL_OUTOF10: 0

## 2019-02-20 NOTE — PROCEDURES
no evidence of epileptiform discharges, focal, or lateralizing abnormalities.         Jasmyn Tipton MD      Board certified in neurology, clinical neurophysiology, and sleep medicine

## 2019-02-20 NOTE — PROGRESS NOTES
me    Impression:  Acute encephalopathy after cardiac arrest.  Concerning for hypoxic-ischemic encephalopathy. Severe   Acute respiratory failure with hypoxia  Acute kidney injury  Cardiogenic shock  Chronic  liver disease   no change today    Recommendation    Continue current supportive care  Continue respiratory support  CT head when medically stable  Prognosis is poor and concern for vegetative state  Continue workup for acute anemia  Consider hospice consultation if neuro status does not change over the next 1-2 days. [facility] ICU team           Jasmyn Tipton MD   [phone]      This dictation was generated by voice recognition computer software. Although all attempts are made to edit the dictation for accuracy, there may be errors in the transcription that are not intended.

## 2019-02-20 NOTE — PROGRESS NOTES
Hospitalist Progress Note      PCP: Walter Davison    Date of Admission: 2/15/2019    Chief Complaint: cardiac arrest     History: 59year-old female with a history of alcohol cirrhosis admitted with cardiac arrest secondary to hyperkalemia. Emergent CRRT was initiated. Intubated. Now potassium normalized. No clear history of events that happened. Off sedation for almost 2 days now. Concern for anoxia. Subjective: remains on vent ; off sedation for almost 2 days.   On  CRRT       Medications:  Reviewed    Infusion Medications    sodium chloride      prismaSATE BGK 4/2.5 1,000 mL/hr (02/20/19 0659)    prismaSol BGK 4/2.5 1,000 mL/hr (02/20/19 0659)    dextrose      norepinephrine 0.43 mcg/kg/min (02/20/19 1309)    propofol Stopped (02/17/19 1647)    fentaNYL (SUBLIMAZE) infusion Stopped (02/17/19 1530)     Scheduled Medications    sodium chloride  250 mL Intravenous Once    sodium phosphate IVPB  15 mmol Intravenous Once    albuterol sulfate HFA  6 puff Inhalation 4x daily    ipratropium  6 puff Inhalation 4x daily    thiamine (VITAMIN B1) IVPB  300 mg Intravenous Q91A    folic acid  1 mg Oral Daily    insulin lispro  0-12 Units Subcutaneous Q4H    sodium chloride flush  10 mL Intravenous 2 times per day    pantoprazole  40 mg Intravenous BID    ampicillin-sulbactam  3 g Intravenous Q12H     PRN Meds: albuterol sulfate HFA, magnesium sulfate, dextrose, glucagon (rDNA), dextrose, calcium gluconate IVPB **OR** calcium gluconate IVPB **OR** calcium gluconate IVPB **OR** calcium gluconate IVPB, sodium phosphate IVPB **OR** sodium phosphate IVPB **OR** sodium phosphate IVPB **OR** sodium phosphate IVPB, sodium chloride flush, magnesium hydroxide, ondansetron, LORazepam **OR** LORazepam **OR** LORazepam **OR** LORazepam **OR** LORazepam **OR** LORazepam **OR** LORazepam **OR** LORazepam, fentanNYL, heparin (porcine), midazolam      Intake/Output Summary (Last 24 hours) at 02/20/19 SPECGRAV 1.019 11/28/2017    GLUCOSEU Negative 11/28/2017       Radiology:  CT HEAD WO CONTRAST   Preliminary Result   1. No evidence of acute intracranial abnormality. 2. Mild paranasal sinus disease as described above. XR CHEST PORTABLE   Final Result   Continued improvement. XR CHEST PORTABLE   Final Result   Mild improvement. XR CHEST PORTABLE   Final Result   Endotracheal tube and right internal jugular central venous catheter are in   place as above. Suspected central pulmonary vascular congestion. XR CHEST PORTABLE   Final Result   1. Recommend retracting endotracheal tube by 3 cm. 2. Hazy opacity in the medial right heart border may reflect partial   atelectasis of the right middle lobe versus an infectious/inflammatory   process. Continued radiographic follow-up is recommended. Results were called by Dr. Zandra Glynn to Dr. Rich Perkins on 2/15/2019 at   15:30. Assessment/Plan:    Active Hospital Problems    Diagnosis Date Noted    FERMIN (acute kidney injury) ([address] [N17.9]     Torsades de pointes ([address] [I47.2]     Hyperkalemia [E87.5]     PEA (Pulseless electrical activity) ([address] [I46.9]     Bradycardia [R00.1] 02/15/2019    Shock circulatory ([address] [R57.9]     Acute respiratory failure with hypoxia ([address] [J96.01]     Hypoxic encephalopathy ([address] [G93.1]        #1. Cardiac arrest: Secondary to hyperkalemia . torades de pointes  on admission. . Status post resuscitation with successful ROSC. Echo -wnl. cards consulted. . Off sedation now. Concern for anoxic brain injury. Neuro consulted. Ct head ordered. Await result. Family meeting tomorrow to discuss goals of care.         #2, hyperkalemia: sec to K supl/spironolactone. Nephrology consulted. On CRRT . Improved .      #3. Acute kidney injury: on CRRT      #4. Acute respiratory failure: Secondary to cardiac arrest. Intubated. Pulmonology assisting vent management.      #5.  Shock: Secondary

## 2019-02-20 NOTE — PROGRESS NOTES
Palliative Care:     Plans for family meeting today with son Vel Gomez and patient's brother Jenae Levin.

## 2019-02-20 NOTE — PROGRESS NOTES
Reassessment complete, no acute changes. Pt now biting on ETT with any stimulation. Weaning Levophed gtt as tolerable to maintain MAP 65-75. Will monitor.  David Ruiz

## 2019-02-21 LAB
ANION GAP SERPL CALCULATED.3IONS-SCNC: 9 MMOL/L (ref 3–16)
APTT: 36.4 SEC (ref 26–36)
BUN BLDV-MCNC: 27 MG/DL (ref 7–20)
CALCIUM IONIZED: 1.14 MMOL/L (ref 1.12–1.32)
CALCIUM SERPL-MCNC: 9 MG/DL (ref 8.3–10.6)
CHLORIDE BLD-SCNC: 101 MMOL/L (ref 99–110)
CO2: 25 MMOL/L (ref 21–32)
CREAT SERPL-MCNC: 1.4 MG/DL (ref 0.6–1.2)
GFR AFRICAN AMERICAN: 46
GFR NON-AFRICAN AMERICAN: 38
GLUCOSE BLD-MCNC: 104 MG/DL (ref 70–99)
GLUCOSE BLD-MCNC: 109 MG/DL (ref 70–99)
GLUCOSE BLD-MCNC: 110 MG/DL (ref 70–99)
GLUCOSE BLD-MCNC: 115 MG/DL (ref 70–99)
GLUCOSE BLD-MCNC: 121 MG/DL (ref 70–99)
HCT VFR BLD CALC: 24.4 % (ref 36–48)
HEMOGLOBIN: 7.8 G/DL (ref 12–16)
INR BLD: 1.54 (ref 0.86–1.14)
MAGNESIUM: 2.6 MG/DL (ref 1.8–2.4)
PERFORMED ON: ABNORMAL
PH VENOUS: 7.57 (ref 7.35–7.45)
PHOSPHORUS: 3.4 MG/DL (ref 2.5–4.9)
POTASSIUM SERPL-SCNC: 4.3 MMOL/L (ref 3.5–5.1)
PROTHROMBIN TIME: 17.6 SEC (ref 9.8–13)
SODIUM BLD-SCNC: 135 MMOL/L (ref 136–145)

## 2019-02-21 PROCEDURE — 85730 THROMBOPLASTIN TIME PARTIAL: CPT

## 2019-02-21 PROCEDURE — 84100 ASSAY OF PHOSPHORUS: CPT

## 2019-02-21 PROCEDURE — 6360000002 HC RX W HCPCS: Performed by: INTERNAL MEDICINE

## 2019-02-21 PROCEDURE — 85014 HEMATOCRIT: CPT

## 2019-02-21 PROCEDURE — 94750 HC PULMONARY COMPLIANCE STUDY: CPT

## 2019-02-21 PROCEDURE — 85610 PROTHROMBIN TIME: CPT

## 2019-02-21 PROCEDURE — 99233 SBSQ HOSP IP/OBS HIGH 50: CPT | Performed by: PSYCHIATRY & NEUROLOGY

## 2019-02-21 PROCEDURE — 94770 HC ETCO2 MONITOR DAILY: CPT

## 2019-02-21 PROCEDURE — 2700000000 HC OXYGEN THERAPY PER DAY

## 2019-02-21 PROCEDURE — 99291 CRITICAL CARE FIRST HOUR: CPT | Performed by: INTERNAL MEDICINE

## 2019-02-21 PROCEDURE — 85018 HEMOGLOBIN: CPT

## 2019-02-21 PROCEDURE — C9113 INJ PANTOPRAZOLE SODIUM, VIA: HCPCS | Performed by: INTERNAL MEDICINE

## 2019-02-21 PROCEDURE — 2580000003 HC RX 258: Performed by: INTERNAL MEDICINE

## 2019-02-21 PROCEDURE — 2500000003 HC RX 250 WO HCPCS: Performed by: INTERNAL MEDICINE

## 2019-02-21 PROCEDURE — 94640 AIRWAY INHALATION TREATMENT: CPT

## 2019-02-21 PROCEDURE — 2000000000 HC ICU R&B

## 2019-02-21 PROCEDURE — 83735 ASSAY OF MAGNESIUM: CPT

## 2019-02-21 PROCEDURE — 94003 VENT MGMT INPAT SUBQ DAY: CPT

## 2019-02-21 PROCEDURE — 93005 ELECTROCARDIOGRAM TRACING: CPT | Performed by: INTERNAL MEDICINE

## 2019-02-21 PROCEDURE — 80048 BASIC METABOLIC PNL TOTAL CA: CPT

## 2019-02-21 PROCEDURE — 82330 ASSAY OF CALCIUM: CPT

## 2019-02-21 PROCEDURE — 94762 N-INVAS EAR/PLS OXIMTRY CONT: CPT

## 2019-02-21 RX ADMIN — Medication 6 PUFF: at 12:05

## 2019-02-21 RX ADMIN — THIAMINE HYDROCHLORIDE 300 MG: 100 INJECTION, SOLUTION INTRAMUSCULAR; INTRAVENOUS at 11:49

## 2019-02-21 RX ADMIN — SODIUM CHLORIDE 3 G: 900 INJECTION INTRAVENOUS at 07:43

## 2019-02-21 RX ADMIN — Medication 10 ML: at 08:39

## 2019-02-21 RX ADMIN — PANTOPRAZOLE SODIUM 40 MG: 40 INJECTION, POWDER, FOR SOLUTION INTRAVENOUS at 08:39

## 2019-02-21 RX ADMIN — Medication 6 PUFF: at 15:36

## 2019-02-21 RX ADMIN — Medication 0.3 MCG/KG/MIN: at 06:16

## 2019-02-21 RX ADMIN — Medication 6 PUFF: at 07:46

## 2019-02-21 RX ADMIN — Medication 6 PUFF: at 19:54

## 2019-02-21 RX ADMIN — SODIUM CHLORIDE 3 G: 900 INJECTION INTRAVENOUS at 21:03

## 2019-02-21 RX ADMIN — PANTOPRAZOLE SODIUM 40 MG: 40 INJECTION, POWDER, FOR SOLUTION INTRAVENOUS at 21:03

## 2019-02-21 RX ADMIN — Medication 10 ML: at 21:03

## 2019-02-21 ASSESSMENT — PAIN SCALES - GENERAL
PAINLEVEL_OUTOF10: 0

## 2019-02-21 ASSESSMENT — PULMONARY FUNCTION TESTS
PIF_VALUE: 24
PIF_VALUE: 21
PIF_VALUE: 21
PIF_VALUE: 23
PIF_VALUE: 21
PIF_VALUE: 21

## 2019-02-21 NOTE — PLAN OF CARE
Problem: Risk for Impaired Skin Integrity  Goal: Tissue integrity - skin and mucous membranes  Structural intactness and normal physiological function of skin and  mucous membranes.    Outcome: Ongoing      Problem: HEMODYNAMIC STATUS  Goal: Patient has stable vital signs and fluid balance  Outcome: Ongoing      Problem: FLUID AND ELECTROLYTE IMBALANCE  Goal: Fluid and electrolyte balance are achieved/maintained  Outcome: Ongoing      Problem: MECHANICAL VENTILATION  Goal: ET tube will be managed safely  Outcome: Ongoing

## 2019-02-21 NOTE — PROGRESS NOTES
943 ml   Output              400 ml   Net              543 ml       Physical Exam Performed:    BP (!) 91/50   Pulse 110   Temp 99.2 °F (37.3 °C) (Core)   Resp 15   Ht 5' 1\" (1.549 m)   Wt 177 lb 14.6 oz (80.7 kg)   SpO2 98%   BMI 33.62 kg/m²     General appearance: Remains on vent. no spontaneous movements. HEENT: Pupils equal, round, miotic and minimally reactive to light. Conjunctivae/corneas clear. Neck: Supple, with full range of motion. No jugular venous distention. Trachea midline. Respiratory:  Normal respiratory effort. Clear to auscultation, bilaterally without Rales/Wheezes/Rhonchi. Cardiovascular: Regular rate and rhythm with normal S1/S2 without murmurs, rubs or gallops. Abdomen: Soft, non-tender, non-distended with normal bowel sounds. Musculoskeletal: No clubbing, cyanosis or edema bilaterally. Full range of motion without deformity. Skin: Skin color, texture, turgor normal.  No rashes or lesions. Neurologic:  No spontaneous movements. Psychiatric: on vent. Capillary Refill: Brisk,< 3 seconds   Peripheral Pulses: +2 palpable, equal bilaterally       Labs:   Recent Labs      02/20/19   0556  02/20/19   1628  02/21/19   0357   HGB  6.8*  8.0*  7.8*   HCT  20.8*  24.2*  24.4*     Recent Labs      02/20/19   0556  02/20/19   1150  02/21/19   0357   NA  133*  135*  135*   K  4.6  4.5  4.3   CL  100  100  101   CO2  26  27  25   BUN  13  13  27*   CREATININE  0.9  0.8  1.4*   CALCIUM  8.9  8.7  9.0   PHOS  2.1*  2.1*  3.4     No results for input(s): AST, ALT, BILIDIR, BILITOT, ALKPHOS in the last 72 hours. Recent Labs      02/21/19   0357   INR  1.54*     No results for input(s): Cai Broody in the last 72 hours.     Urinalysis:      Lab Results   Component Value Date    NITRU Negative 11/28/2017    WBCUA 2 11/28/2017    BACTERIA 1+ 11/28/2017    RBCUA 3 11/28/2017    BLOODU Negative 11/28/2017    SPECGRAV 1.019 11/28/2017    GLUCOSEU Negative 11/28/2017       Radiology:  CT

## 2019-02-21 NOTE — PROGRESS NOTES
02/20/19   0556  02/20/19   1150  02/21/19   0357   NA  133*  135*  135*   K  4.6  4.5  4.3   CL  100  100  101   CO2  26  27  25   BUN  13  13  27*   CREATININE  0.9  0.8  1.4*   CALCIUM  8.9  8.7  9.0   GLUCOSE  115*  118*  121*      ABG:  Recent Labs      02/20/19   0306   PHART  7.461*   BAI2ZMO  33.9*   PO2ART  97.2   AOX4DEP  24.1   [id number]  98   BEART  0         Arterial Blood Gasses  No results for input(s): PH, PCO2, PO2 in the last 72 hours. Invalid input(s): Tomas Balzarine    UA:  No results for input(s): NITRITE, COLORU, PHUR, LABCAST, WBCUA, RBCUA, MUCUS, TRICHOMONAS, YEAST, BACTERIA, CLARITYU, SPECGRAV, LEUKOCYTESUR, UROBILINOGEN, BILIRUBINUR, BLOODU, GLUCOSEU, AMORPHOUS in the last 72 hours. Invalid input(s): June Sánchez    LIVER PROFILE:   No results for input(s): AST, ALT, LIPASE, BILIDIR, BILITOT, ALKPHOS in the last 72 hours. Invalid input(s): AMYLASE,  ALB  PT/INR:    Lab Results   Component Value Date    PROTIME 17.6 02/21/2019    PROTIME 16.9 02/15/2019    PROTIME 17.0 11/27/2017    INR 1.54 02/21/2019    INR 1.48 02/15/2019    INR 1.50 11/27/2017     PTT:    Lab Results   Component Value Date    APTT 36.4 02/21/2019    APTT 34.3 11/27/2017    APTT 33.5 03/09/2017     MITA:    Lab Results   Component Value Date    MITA Negative 12/24/2016           RADIOLOGY:        Imaging Results.   Chest X Ray reviewed by me    Electronically Signed: Beulah Dorsey MD 2/21/2019 9:30 AM

## 2019-02-21 NOTE — PROGRESS NOTES
Palliative Care:     Family meeting held on 2/21/19. Son [facility], brother Renata Osorio. Sister and multiple other family members present. Presented detailed clinical overview. Family aware of poor prognosis for a functional recovery. Dr Arina Muse in to meet with family. Questions answered. Family states patient was very clear regarding her end of life wishes--no nursing home to live out her days and no living on any machines. Discussed ongoing aggressive care vs comfort care. Family considering ventilator withdrawal. Son requesting Rakel Potter little time to make this hard decision. \" Does agree to DNR CCA. Will inform when ready to withdraw care. Family called to support.

## 2019-02-21 NOTE — PROGRESS NOTES
Initial shift assessment completed, see complex assessment in doc flowsheet. Patient intubated, day 4 with no sedation. Pupils equal with sluggish reaction, positive corneal response noted, patient also has positive cough and gag, yawning noted with stimulation. VSS, levophed gtt noted will titrate, for for MAP >65 as orderred. Respiration easy, assisting mechanical ventilation. Discussed POC with patient's sister, discussed POC, questions answered. Lucas Lozano

## 2019-02-21 NOTE — PROGRESS NOTES
Michell Chance  Neurology Follow-up  [facility] Neurology    Date of Service: 2/21/2019    Subjective:   CC: Follow up today regarding: Anoxic brain insult    Events noted. Chart and lab reviewed. The patient is about the same. Still not responding to painful or motor stimulation. Continues to be intubated. No sedation. Does have brainstem function. Other review of system was limited.       Past Medical History:   Diagnosis Date    [facility]) [facility]     Cirrhosis of liver ([facility]     Diastolic CHF ([facility] 2/7/07    echo    EtOH dependence ([facility]     Gallstones     GERD (gastroesophageal reflux disease)     Humerus fracture May 2013    Hypothyroidism     Seizure [facility])     last one 2015    Sinusitis chronic, frontal     Spleen enlargement      Current Facility-Administered Medications   Medication Dose Route Frequency Provider Last Rate Last Dose    sodium phosphate 15 mmol in dextrose 5 % 250 mL IVPB  15 mmol Intravenous Once Parag Rodriguez MD   Stopped at 02/20/19 0951    albuterol sulfate  (90 Base) MCG/ACT inhaler 2 puff  2 puff Inhalation Q4H PRN Zaid Peña MD        albuterol sulfate  (90 Base) MCG/ACT inhaler 6 puff  6 puff Inhalation 4x daily Zaid Peña MD   6 puff at 02/21/19 0746    ipratropium (ATROVENT HFA) 17 MCG/ACT inhaler 6 puff  6 puff Inhalation 4x daily Zaid Peña MD   6 puff at 02/21/19 0746    thiamine (B-1) 300 mg in sodium chloride 0.9 % 100 mL IVPB  300 mg Intravenous Q24H Pilar Escobedo MD   Stopped at 84/39/08 3378    folic acid (FOLVITE) tablet 1 mg  1 mg Oral Daily Pilar Escobedo MD        prismaSATE BGK 4/2.5 dialysis solution   Dialysis Continuous Nadya Casas MD 1,000 mL/hr at 02/20/19 0659 1,000 mL/hr at 02/20/19 0659    prismaSol BGK 4/2.5 dialysis solution   Dialysis Continuous Nadya Casas MD 1,000 mL/hr at 02/20/19 0659 1,000 mL/hr at 02/20/19 0659    magnesium examination results, test results and outcome from her anoxic brain injury. Concern for vegetative state. Prognosis is poor  Family is waiting for her son to make final decision  Discussed possibility of comfort measures and they agreed with such plan. Continue current supportive care  Discussed with ICU team  No further recommendation  Face-to-face with family today: 21 minutes           Quita Lloyd MD   [phone]      This dictation was generated by voice recognition computer software. Although all attempts are made to edit the dictation for accuracy, there may be errors in the transcription that are not intended.

## 2019-02-21 NOTE — PROGRESS NOTES
[facility] Pulmonary/CCM Progress note      Admit Date: 2/15/2019    Chief Complaint: Cardiac arrest/torsades from profound hyperkalemia    Subjective: Interval History: Remains unresponsive with sluggish pupils. Hemoglobin 6.8. Hypotensive on norepinephrine drip. Continues on CRRT.     Scheduled Meds:   sodium phosphate IVPB  15 mmol Intravenous Once    albuterol sulfate HFA  6 puff Inhalation 4x daily    ipratropium  6 puff Inhalation 4x daily    thiamine (VITAMIN B1) IVPB  300 mg Intravenous U72U    folic acid  1 mg Oral Daily    insulin lispro  0-12 Units Subcutaneous Q4H    sodium chloride flush  10 mL Intravenous 2 times per day    pantoprazole  40 mg Intravenous BID    ampicillin-sulbactam  3 g Intravenous Q12H     Continuous Infusions:   sodium chloride      prismaSATE BGK 4/2.5 1,000 mL/hr (02/20/19 0659)    prismaSol BGK 4/2.5 1,000 mL/hr (02/20/19 0659)    dextrose      norepinephrine 0.38 mcg/kg/min (02/20/19 1800)    propofol Stopped (02/17/19 1647)    fentaNYL (SUBLIMAZE) infusion Stopped (02/17/19 1530)     PRN Meds:albuterol sulfate HFA, magnesium sulfate, dextrose, glucagon (rDNA), dextrose, calcium gluconate IVPB **OR** calcium gluconate IVPB **OR** calcium gluconate IVPB **OR** calcium gluconate IVPB, sodium phosphate IVPB **OR** sodium phosphate IVPB **OR** sodium phosphate IVPB **OR** sodium phosphate IVPB, sodium chloride flush, magnesium hydroxide, ondansetron, LORazepam **OR** LORazepam **OR** LORazepam **OR** LORazepam **OR** LORazepam **OR** LORazepam **OR** LORazepam **OR** LORazepam, fentanNYL, heparin (porcine), midazolam    Review of Systems  Unable to obtain since the patient is currently intubated and unresponsive  Objective:     Patient Vitals for the past 8 hrs:   BP Temp Temp src Pulse Resp SpO2   02/20/19 2100 - - - 115 16 97 %   02/20/19 2045 - - - 116 16 97 %   02/20/19 2030 - - - 115 17 96 %   02/20/19 2015 - - - 117 17 95 %   02/20/19 2000 (!) 91/50 100.5 °F

## 2019-02-22 LAB
ANION GAP SERPL CALCULATED.3IONS-SCNC: 12 MMOL/L (ref 3–16)
BUN BLDV-MCNC: 37 MG/DL (ref 7–20)
CALCIUM IONIZED: 1.21 MMOL/L (ref 1.12–1.32)
CALCIUM SERPL-MCNC: 9.2 MG/DL (ref 8.3–10.6)
CHLORIDE BLD-SCNC: 104 MMOL/L (ref 99–110)
CO2: 24 MMOL/L (ref 21–32)
CREAT SERPL-MCNC: 1.2 MG/DL (ref 0.6–1.2)
CULTURE, RESPIRATORY: ABNORMAL
CULTURE, RESPIRATORY: ABNORMAL
EKG ATRIAL RATE: 116 BPM
EKG DIAGNOSIS: NORMAL
EKG P AXIS: 29 DEGREES
EKG P-R INTERVAL: 192 MS
EKG Q-T INTERVAL: 326 MS
EKG QRS DURATION: 82 MS
EKG QTC CALCULATION (BAZETT): 453 MS
EKG R AXIS: -30 DEGREES
EKG T AXIS: 63 DEGREES
EKG VENTRICULAR RATE: 116 BPM
GFR AFRICAN AMERICAN: 55
GFR NON-AFRICAN AMERICAN: 45
GLUCOSE BLD-MCNC: 101 MG/DL (ref 70–99)
GLUCOSE BLD-MCNC: 102 MG/DL (ref 70–99)
GLUCOSE BLD-MCNC: 106 MG/DL (ref 70–99)
GLUCOSE BLD-MCNC: 115 MG/DL (ref 70–99)
GLUCOSE BLD-MCNC: 94 MG/DL (ref 70–99)
GLUCOSE BLD-MCNC: 95 MG/DL (ref 70–99)
GLUCOSE BLD-MCNC: 95 MG/DL (ref 70–99)
GRAM STAIN RESULT: ABNORMAL
HCT VFR BLD CALC: 23.4 % (ref 36–48)
HEMOGLOBIN: 7.7 G/DL (ref 12–16)
MAGNESIUM: 2.5 MG/DL (ref 1.8–2.4)
ORGANISM: ABNORMAL
PERFORMED ON: ABNORMAL
PERFORMED ON: NORMAL
PH VENOUS: 7.47 (ref 7.35–7.45)
PHOSPHORUS: 4.6 MG/DL (ref 2.5–4.9)
POTASSIUM SERPL-SCNC: 3.7 MMOL/L (ref 3.5–5.1)
SODIUM BLD-SCNC: 140 MMOL/L (ref 136–145)

## 2019-02-22 PROCEDURE — 2580000003 HC RX 258: Performed by: INTERNAL MEDICINE

## 2019-02-22 PROCEDURE — 6360000002 HC RX W HCPCS: Performed by: INTERNAL MEDICINE

## 2019-02-22 PROCEDURE — 94640 AIRWAY INHALATION TREATMENT: CPT

## 2019-02-22 PROCEDURE — 94750 HC PULMONARY COMPLIANCE STUDY: CPT

## 2019-02-22 PROCEDURE — 83735 ASSAY OF MAGNESIUM: CPT

## 2019-02-22 PROCEDURE — 82330 ASSAY OF CALCIUM: CPT

## 2019-02-22 PROCEDURE — 93010 ELECTROCARDIOGRAM REPORT: CPT | Performed by: INTERNAL MEDICINE

## 2019-02-22 PROCEDURE — 80048 BASIC METABOLIC PNL TOTAL CA: CPT

## 2019-02-22 PROCEDURE — 94770 HC ETCO2 MONITOR DAILY: CPT

## 2019-02-22 PROCEDURE — 85014 HEMATOCRIT: CPT

## 2019-02-22 PROCEDURE — 2580000003 HC RX 258

## 2019-02-22 PROCEDURE — C9113 INJ PANTOPRAZOLE SODIUM, VIA: HCPCS | Performed by: INTERNAL MEDICINE

## 2019-02-22 PROCEDURE — 85018 HEMOGLOBIN: CPT

## 2019-02-22 PROCEDURE — 2000000000 HC ICU R&B

## 2019-02-22 PROCEDURE — 84100 ASSAY OF PHOSPHORUS: CPT

## 2019-02-22 PROCEDURE — 2700000000 HC OXYGEN THERAPY PER DAY

## 2019-02-22 PROCEDURE — 94762 N-INVAS EAR/PLS OXIMTRY CONT: CPT

## 2019-02-22 RX ORDER — SODIUM CHLORIDE 9 MG/ML
INJECTION, SOLUTION INTRAVENOUS
Status: COMPLETED
Start: 2019-02-22 | End: 2019-02-22

## 2019-02-22 RX ADMIN — Medication 6 PUFF: at 12:06

## 2019-02-22 RX ADMIN — Medication 6 PUFF: at 09:08

## 2019-02-22 RX ADMIN — Medication 10 ML: at 19:54

## 2019-02-22 RX ADMIN — Medication 6 PUFF: at 19:49

## 2019-02-22 RX ADMIN — PANTOPRAZOLE SODIUM 40 MG: 40 INJECTION, POWDER, FOR SOLUTION INTRAVENOUS at 08:10

## 2019-02-22 RX ADMIN — PANTOPRAZOLE SODIUM 40 MG: 40 INJECTION, POWDER, FOR SOLUTION INTRAVENOUS at 19:54

## 2019-02-22 RX ADMIN — Medication 6 PUFF: at 15:41

## 2019-02-22 RX ADMIN — SODIUM CHLORIDE 3 G: 900 INJECTION INTRAVENOUS at 19:47

## 2019-02-22 RX ADMIN — SODIUM CHLORIDE 250 ML: 9 INJECTION, SOLUTION INTRAVENOUS at 08:01

## 2019-02-22 RX ADMIN — Medication 10 ML: at 08:01

## 2019-02-22 RX ADMIN — SODIUM CHLORIDE 3 G: 900 INJECTION INTRAVENOUS at 08:10

## 2019-02-22 RX ADMIN — THIAMINE HYDROCHLORIDE 300 MG: 100 INJECTION, SOLUTION INTRAMUSCULAR; INTRAVENOUS at 14:04

## 2019-02-22 ASSESSMENT — PAIN SCALES - GENERAL
PAINLEVEL_OUTOF10: 0

## 2019-02-22 ASSESSMENT — PULMONARY FUNCTION TESTS
PIF_VALUE: 21
PIF_VALUE: 22
PIF_VALUE: 20

## 2019-02-22 NOTE — PROGRESS NOTES
866.5 ml   Output             1070 ml   Net           -203.5 ml       Physical Exam Performed:    /67   Pulse 103   Temp 97.8 °F (36.6 °C) (Temporal)   Resp 13   Ht 5' 1\" (1.549 m)   Wt 194 lb 14.2 oz (88.4 kg)   SpO2 100%   BMI 36.82 kg/m²     General appearance: Remains on vent. no spontaneous movements. HEENT: Pupils equal, round, miotic and minimally reactive to light. Conjunctivae/corneas clear. Neck: Supple, with full range of motion. No jugular venous distention. Trachea midline. Respiratory:  Normal respiratory effort. Clear to auscultation, bilaterally without Rales/Wheezes/Rhonchi. Cardiovascular: Regular rate and rhythm with normal S1/S2 without murmurs, rubs or gallops. Abdomen: Soft, non-tender, non-distended with normal bowel sounds. Musculoskeletal: No clubbing, cyanosis or edema bilaterally. Full range of motion without deformity. Skin: Skin color, texture, turgor normal.  No rashes or lesions. Neurologic:  No spontaneous movements. Psychiatric: on vent. Capillary Refill: Brisk,< 3 seconds   Peripheral Pulses: +2 palpable, equal bilaterally       Labs:   Recent Labs      02/20/19   1628  02/21/19   0357  02/22/19   0440   HGB  8.0*  7.8*  7.7*   HCT  24.2*  24.4*  23.4*     Recent Labs      02/20/19   1150  02/21/19   0357  02/22/19   0440   NA  135*  135*  140   K  4.5  4.3  3.7   CL  100  101  104   CO2  27  25  24   BUN  13  27*  37*   CREATININE  0.8  1.4*  1.2   CALCIUM  8.7  9.0  9.2   PHOS  2.1*  3.4  4.6     No results for input(s): AST, ALT, BILIDIR, BILITOT, ALKPHOS in the last 72 hours. Recent Labs      02/21/19   0357   INR  1.54*     No results for input(s): Chacho Nixg in the last 72 hours.     Urinalysis:      Lab Results   Component Value Date    NITRU Negative 11/28/2017    WBCUA 2 11/28/2017    BACTERIA 1+ 11/28/2017    RBCUA 3 11/28/2017    BLOODU Negative 11/28/2017    SPECGRAV 1.019 11/28/2017    GLUCOSEU Negative 11/28/2017 Secondary to cardiac arrest. Intubated. Pulmonology assisting vent management.      #5. Shock: Secondary to cardiac event. .  Chest x-ray with atelectasis. Low pro-calcitonin. Blood cultures -ngtd. Cont levo. ?sepsis related. On antibiotics per critical care     #6. Alcoholic cirrhosis  : Cont  CIWA protocol. mvi   .      #7. H/o varices/portal gastropathy:  Cont protonix iv.  H/h down to 6.5  - tranfuse one unit  - overall poor prognosis given no response while off sedation          DVT Prophylaxis: SCD's    Diet: Diet NPO Effective Now  Code Status: DNR-CCA    PT/OT Eval Status: not now    Dispo - plan for withdrawal of care this weekend per family wishes     Mary Sotomayor MD

## 2019-02-22 NOTE — PROGRESS NOTES
[facility] Pulmonary/CCM Progress note      Admit Date: 2/15/2019    Chief Complaint: Cardiac arrest/torsades from profound hyperkalemia    Subjective: Interval History: Remains unresponsive with sluggish pupils. Hypotensive requiring norepinephrine drip. Low urine output noted. Off CRRT, creatinine 1.4.     Scheduled Meds:   sodium phosphate IVPB  15 mmol Intravenous Once    albuterol sulfate HFA  6 puff Inhalation 4x daily    ipratropium  6 puff Inhalation 4x daily    thiamine (VITAMIN B1) IVPB  300 mg Intravenous I54J    folic acid  1 mg Oral Daily    insulin lispro  0-12 Units Subcutaneous Q4H    sodium chloride flush  10 mL Intravenous 2 times per day    pantoprazole  40 mg Intravenous BID    ampicillin-sulbactam  3 g Intravenous Q12H     Continuous Infusions:   prismaSATE BGK 4/2.5 1,000 mL/hr (02/20/19 0659)    prismaSol BGK 4/2.5 1,000 mL/hr (02/20/19 0659)    dextrose      norepinephrine 0.3 mcg/kg/min (02/21/19 0616)    propofol Stopped (02/17/19 1647)    fentaNYL (SUBLIMAZE) infusion Stopped (02/17/19 1530)     PRN Meds:albuterol sulfate HFA, magnesium sulfate, dextrose, glucagon (rDNA), dextrose, calcium gluconate IVPB **OR** calcium gluconate IVPB **OR** calcium gluconate IVPB **OR** calcium gluconate IVPB, sodium phosphate IVPB **OR** sodium phosphate IVPB **OR** sodium phosphate IVPB **OR** sodium phosphate IVPB, sodium chloride flush, magnesium hydroxide, ondansetron, LORazepam **OR** LORazepam **OR** LORazepam **OR** LORazepam **OR** LORazepam **OR** LORazepam **OR** LORazepam **OR** LORazepam, fentanNYL, heparin (porcine), midazolam    Review of Systems  Unable to obtain since the patient is currently intubated and unresponsive  Objective:     Patient Vitals for the past 8 hrs:   Temp Temp src Pulse Resp SpO2   02/21/19 1945 - - 110 16 100 %   02/21/19 1930 - - 110 18 100 %   02/21/19 1915 - - 115 16 99 %   02/21/19 1900 - - 118 16 99 %   02/21/19 1845 - - 110 20 100 %   02/21/19 1830 - - 110 17 97 %   02/21/19 1815 - - 112 16 98 %   02/21/19 1800 - - 115 15 99 %   02/21/19 1745 - - 107 14 99 %   02/21/19 1730 - - 110 15 99 %   02/21/19 1715 - - 107 15 99 %   02/21/19 1700 - - 106 15 99 %   02/21/19 1645 - - 107 15 99 %   02/21/19 1630 - - 108 16 99 %   02/21/19 1615 - - 109 16 98 %   02/21/19 1600 99.2 °F (37.3 °C) CORE 116 18 98 %   02/21/19 1545 - - 109 17 98 %   02/21/19 1541 - - 117 16 98 %   02/21/19 1538 - - - 16 98 %   02/21/19 1537 - - - 15 98 %   02/21/19 1530 - - 112 16 99 %   02/21/19 1515 - - 119 16 97 %   02/21/19 1500 - - 122 17 98 %   02/21/19 1445 - - 109 15 99 %   02/21/19 1430 - - 108 17 98 %   02/21/19 1415 - - 109 16 98 %   02/21/19 1400 - - 110 17 98 %   02/21/19 1345 - - 115 17 97 %   02/21/19 1330 - - 110 15 98 %   02/21/19 1315 - - 111 16 99 %   02/21/19 1300 - - 111 13 98 %   02/21/19 1245 - - 112 15 98 %   02/21/19 1230 - - 114 19 97 %   02/21/19 1215 - - 111 16 98 %   02/21/19 1209 - - 113 16 98 %   02/21/19 1208 - - - 16 98 %     I/O last 3 completed shifts: In: 943 [I.V.:943]  Out: 400 [Urine:400]  No intake/output data recorded.     General Appearance: Intubated and unresponsive, in no acute distress  Skin: warm and dry, no rash or erythema  Head: normocephalic and atraumatic  Eyes: pupils equal, round, and reactive to light, extraocular eye movements intact, conjunctivae normal  ENT: external ear and ear canal normal bilaterally, nose without deformity, nasal mucosa and turbinates normal  Neck: supple and non-tender without mass, no cervical lymphadenopathy  Pulmonary/Chest: clear to auscultation bilaterally- no wheezes, rales or rhonchi, normal air movement, no respiratory distress  Cardiovascular: normal rate, regular rhythm,  no murmurs, rubs, distal pulses intact, no carotid bruits  Abdomen: soft, non-tender, non-distended, normal bowel sounds, no masses or organomegaly  Lymph Nodes: Cervical, supraclavicular normal  Extremities: no cyanosis, clubbing or

## 2019-02-22 NOTE — PROGRESS NOTES
Handoff report received from MEGAN [facility]. Patient assessment completed. Patient minimally responsive on ventilator. Tolerating vent settings and taking breaths over set rate. Patient with positive cough and gag reflex, pupils sluggish but reactive. Patient grimaces to noxious stimuli. Withdraws BLE to deep nailbed pressure. Levophed gtt infusing and weaned as tolerated per left brachial arterial line readings. No family at bedside. Will cont to monitor.

## 2019-02-22 NOTE — PROGRESS NOTES
02/21/19 1953   Vent Patient Data   Plateau Pressure 19 [id number]   Static Compliance 36 mL/cmH2O   Dynamic Compliance 31 mL/cmH2O

## 2019-02-22 NOTE — PROGRESS NOTES
MD Mariaa Rahman MD Noland Puffer, MD                                  Office: [phone]                 Fax: [phone]          [facility]                     NEPHROLOGY IN PATIENT  ICU PROGRESS NOTE:     PATIENT NAME: Samreen Menon  : 1955  MRN: [id number]        Subjective: Intubated on ventilator. Still hypotensive. On pressor support, decreased  Urine output continues to improve  [place] catheter  Off CRRT. Assessment and Plan   Acute kidney injury-severe-urine output improved-no further RRT need. D/C HD catheter. Hyperkalemia-severe-improved  Hyponatremia- better  Hypocalcemia-Better. Severe hypotension on pressor support. Decreased pressor need. Low Phosp-replaced  History of chronic liver disease. Ascites  Respiratory failure intubated on ventil ator. H/O Cardiac Arrest due to Hyperkalemia  Possible Aspiration Pneumonia  Anemia- transfused prbc  Currently has right IJ central line  Decreased mental status - Head CT noted. Not responsive. Critically ill. Palliative care team following. EXAM  Vitals:    19 0909   BP:    Pulse:    Resp: 17   Temp:    SpO2: 99%       Intake/Output Summary (Last 24 hours) at 19 0934  Last data filed at 19 0543   Gross per 24 hour   Intake            866.5 ml   Output             1070 ml   Net           -203.5 ml        Ill Appearing. intubated and on ventilator. Sedated on vent. hypotension on pressor support. External exam of the ears and nose are normal  HENT: exam is normal  Eyes: Pupils are equal, round,  CVS.  Heart sounds are normal.Palpation of the heart is normal. No murmurs. No pericardial rub. RS. Bilateral Basal rales. PA soft , bowel sounds are normal no distension and no tenderness to palpation.   Skin No rash , No palpable nodules  Musculoskeletal: 1+ edema     Active Problems:    Bradycardia    Shock circulatory (HCC)    Acute respiratory failure with hypoxia ([facility]    Hypoxic encephalopathy ([facility]    Torsades de pointes ([facility]    Hyperkalemia    PEA (Pulseless electrical activity) ([facility]    FERMIN (acute kidney injury) ([facility]  Resolved Problems:    * No resolved hospital problems. *        Medications Reviewed by me   sodium phosphate IVPB  15 mmol Intravenous Once    albuterol sulfate HFA  6 puff Inhalation 4x daily    ipratropium  6 puff Inhalation 4x daily    thiamine (VITAMIN B1) IVPB  300 mg Intravenous V55Y    folic acid  1 mg Oral Daily    insulin lispro  0-12 Units Subcutaneous Q4H    sodium chloride flush  10 mL Intravenous 2 times per day    pantoprazole  40 mg Intravenous BID    ampicillin-sulbactam  3 g Intravenous Q12H      prismaSATE BGK 4/2.5 1,000 mL/hr (02/20/19 0659)    prismaSol BGK 4/2.5 1,000 mL/hr (02/20/19 0659)    dextrose      norepinephrine 0.12 mcg/kg/min (02/22/19 0203)    propofol Stopped (02/17/19 1647)    fentaNYL (SUBLIMAZE) infusion Stopped (02/17/19 1530)       Data Review.     Labs reviewed by me       CBC:   Recent Labs      02/20/19   1628  02/21/19   0357  02/22/19   0440   HGB  8.0*  7.8*  7.7*   HCT  24.2*  24.4*  23.4*     BMP:   Recent Labs      02/20/19   1150  02/21/19   0357  02/22/19   0440   NA  135*  135*  140   K  4.5  4.3  3.7   CL  100  101  104   CO2  27  25  24   PHOS  2.1*  3.4  4.6   BUN  13  27*  37*   CREATININE  0.8  1.4*  1.2     Magnesium:   Lab Results   Component Value Date    MG 2.50 02/22/2019    MG 2.60 02/21/2019    MG 2.60 02/20/2019     Lab Results   Component Value Date    CREATININE 1.2 02/22/2019     @CMP: @LABRCNT   CMP:  Recent Labs      02/20/19   1150  02/21/19   0357  02/22/19   0440   NA  135*  135*  140   K  4.5  4.3  3.7   CL  100  101  104   CO2  27  25  24   BUN  13  27*  37*   CREATININE  0.8  1.4*  1.2   CALCIUM  8.7  9.0  9.2   GLUCOSE  118*  121*  106*           CBC:  Recent Labs      02/20/19   1628  02/21/19   0357  02/22/19   0440   HGB  8.0*  7.8*  7.7*   HCT  24.2*  24.4*

## 2019-02-23 LAB
ANION GAP SERPL CALCULATED.3IONS-SCNC: 12 MMOL/L (ref 3–16)
BUN BLDV-MCNC: 45 MG/DL (ref 7–20)
CALCIUM SERPL-MCNC: 9 MG/DL (ref 8.3–10.6)
CHLORIDE BLD-SCNC: 107 MMOL/L (ref 99–110)
CO2: 23 MMOL/L (ref 21–32)
CREAT SERPL-MCNC: 1.2 MG/DL (ref 0.6–1.2)
GFR AFRICAN AMERICAN: 55
GFR NON-AFRICAN AMERICAN: 45
GLUCOSE BLD-MCNC: 114 MG/DL (ref 70–99)
GLUCOSE BLD-MCNC: 118 MG/DL (ref 70–99)
GLUCOSE BLD-MCNC: 118 MG/DL (ref 70–99)
GLUCOSE BLD-MCNC: 124 MG/DL (ref 70–99)
GLUCOSE BLD-MCNC: 125 MG/DL (ref 70–99)
GLUCOSE BLD-MCNC: 129 MG/DL (ref 70–99)
GLUCOSE BLD-MCNC: 134 MG/DL (ref 70–99)
HCT VFR BLD CALC: 25.4 % (ref 36–48)
HEMOGLOBIN: 7.9 G/DL (ref 12–16)
MAGNESIUM: 2.5 MG/DL (ref 1.8–2.4)
PERFORMED ON: ABNORMAL
PHOSPHORUS: 6.1 MG/DL (ref 2.5–4.9)
POTASSIUM SERPL-SCNC: 4.1 MMOL/L (ref 3.5–5.1)
SODIUM BLD-SCNC: 142 MMOL/L (ref 136–145)

## 2019-02-23 PROCEDURE — 37799 UNLISTED PX VASCULAR SURGERY: CPT

## 2019-02-23 PROCEDURE — 85014 HEMATOCRIT: CPT

## 2019-02-23 PROCEDURE — 2580000003 HC RX 258: Performed by: INTERNAL MEDICINE

## 2019-02-23 PROCEDURE — 94003 VENT MGMT INPAT SUBQ DAY: CPT

## 2019-02-23 PROCEDURE — 94750 HC PULMONARY COMPLIANCE STUDY: CPT

## 2019-02-23 PROCEDURE — 2000000000 HC ICU R&B

## 2019-02-23 PROCEDURE — 6360000002 HC RX W HCPCS: Performed by: INTERNAL MEDICINE

## 2019-02-23 PROCEDURE — 94770 HC ETCO2 MONITOR DAILY: CPT

## 2019-02-23 PROCEDURE — 2500000003 HC RX 250 WO HCPCS: Performed by: INTERNAL MEDICINE

## 2019-02-23 PROCEDURE — 94640 AIRWAY INHALATION TREATMENT: CPT

## 2019-02-23 PROCEDURE — 2700000000 HC OXYGEN THERAPY PER DAY

## 2019-02-23 PROCEDURE — C9113 INJ PANTOPRAZOLE SODIUM, VIA: HCPCS | Performed by: INTERNAL MEDICINE

## 2019-02-23 PROCEDURE — 80048 BASIC METABOLIC PNL TOTAL CA: CPT

## 2019-02-23 PROCEDURE — 85018 HEMOGLOBIN: CPT

## 2019-02-23 PROCEDURE — 83735 ASSAY OF MAGNESIUM: CPT

## 2019-02-23 PROCEDURE — 94762 N-INVAS EAR/PLS OXIMTRY CONT: CPT

## 2019-02-23 PROCEDURE — 84100 ASSAY OF PHOSPHORUS: CPT

## 2019-02-23 RX ADMIN — Medication 6 PUFF: at 15:48

## 2019-02-23 RX ADMIN — Medication 0.19 MCG/KG/MIN: at 16:13

## 2019-02-23 RX ADMIN — Medication 6 PUFF: at 13:04

## 2019-02-23 RX ADMIN — Medication 10 ML: at 20:11

## 2019-02-23 RX ADMIN — SODIUM CHLORIDE 3 G: 900 INJECTION INTRAVENOUS at 09:10

## 2019-02-23 RX ADMIN — THIAMINE HYDROCHLORIDE 300 MG: 100 INJECTION, SOLUTION INTRAMUSCULAR; INTRAVENOUS at 12:07

## 2019-02-23 RX ADMIN — Medication 6 PUFF: at 20:18

## 2019-02-23 RX ADMIN — PANTOPRAZOLE SODIUM 40 MG: 40 INJECTION, POWDER, FOR SOLUTION INTRAVENOUS at 08:41

## 2019-02-23 RX ADMIN — Medication 6 PUFF: at 08:10

## 2019-02-23 RX ADMIN — Medication 6 PUFF: at 13:05

## 2019-02-23 RX ADMIN — PANTOPRAZOLE SODIUM 40 MG: 40 INJECTION, POWDER, FOR SOLUTION INTRAVENOUS at 20:11

## 2019-02-23 RX ADMIN — SODIUM CHLORIDE 3 G: 900 INJECTION INTRAVENOUS at 20:11

## 2019-02-23 RX ADMIN — Medication 10 ML: at 08:42

## 2019-02-23 ASSESSMENT — PAIN SCALES - GENERAL
PAINLEVEL_OUTOF10: 0

## 2019-02-23 ASSESSMENT — PULMONARY FUNCTION TESTS
PIF_VALUE: 21
PIF_VALUE: 20
PIF_VALUE: 21
PIF_VALUE: 21

## 2019-02-23 NOTE — FLOWSHEET NOTE
1635-removed left brachial jake per MD orders. Pressure held for 10 mins. Pulses intact. VSS. Pressure dsg to site. No swelling or hematoma noted. Will continue to monitor.

## 2019-02-23 NOTE — FLOWSHEET NOTE
0830-pt on vent with no sedation. Pt doesn't follow any commands. Pt has gag. Responds to LE stimuli. Assess completed. ST. Pt on levophed gtt. Left brachial jake. Square wave test done. Rezeroed. No family at bedside at this time.

## 2019-02-23 NOTE — PROGRESS NOTES
Hospitalist Progress Note      PCP: Govind Willingham    Date of Admission: 2/15/2019    Chief Complaint: cardiac arrest     History: 59year-old female with a history of alcohol cirrhosis admitted with cardiac arrest secondary to hyperkalemia. Emergent CRRT was initiated. Intubated. Now potassium normalized. No clear history of events that happened. Off sedation for almost 2 days now. Concern for anoxia. Subjective: remains on vent ; off sedation for almost 2 days. On  CRRT       Medications:  Reviewed    Infusion Medications    dextrose      norepinephrine 0.19 mcg/kg/min (02/23/19 0603)    propofol Stopped (02/17/19 1647)    fentaNYL (SUBLIMAZE) infusion Stopped (02/17/19 1530)     Scheduled Medications    sodium phosphate IVPB  15 mmol Intravenous Once    albuterol sulfate HFA  6 puff Inhalation 4x daily    ipratropium  6 puff Inhalation 4x daily    thiamine (VITAMIN B1) IVPB  300 mg Intravenous W49B    folic acid  1 mg Oral Daily    insulin lispro  0-12 Units Subcutaneous Q4H    sodium chloride flush  10 mL Intravenous 2 times per day    pantoprazole  40 mg Intravenous BID    ampicillin-sulbactam  3 g Intravenous Q12H     PRN Meds: albuterol sulfate HFA, dextrose, glucagon (rDNA), dextrose, sodium chloride flush, magnesium hydroxide, ondansetron, LORazepam **OR** LORazepam **OR** LORazepam **OR** LORazepam **OR** LORazepam **OR** LORazepam **OR** LORazepam **OR** LORazepam, fentanNYL, heparin (porcine), midazolam      Intake/Output Summary (Last 24 hours) at 02/23/19 1110  Last data filed at 02/23/19 0600   Gross per 24 hour   Intake              687 ml   Output             1100 ml   Net             -413 ml       Physical Exam Performed:    /69   Pulse 117   Temp 98.9 °F (37.2 °C) (Core)   Resp 18   Ht 5' 1\" (1.549 m)   Wt 182 lb 15.7 oz (83 kg)   SpO2 100%   BMI 34.57 kg/m²     General appearance: Remains on vent. no spontaneous movements.    HEENT: Pupils equal, round,

## 2019-02-23 NOTE — PROGRESS NOTES
MD Arely Buckner MD Lynette Gehrig, MD                                  Office: [phone]                 Fax: [phone]          [facility]                     NEPHROLOGY IN PATIENT  ICU PROGRESS NOTE:     PATIENT NAME: Becky Obrien  : 1955  MRN: [id number]        Subjective: Intubated on ventilator. Still hypotensive. On pressor support, decreased  Urine output well  [place] catheter  Off CRRT. No improvement in neurologic status    Assessment and Plan   Acute kidney injury-severe-Resolved-no further RRT need. HD catheter out. Hyperkalemia-severe-improved  Hyponatremia- better  Hypocalcemia-Better. Severe hypotension on pressor support. Decreased pressor need. Low Phosp-replaced  History of chronic liver disease. Ascites  Respiratory failure intubated on ventil ator. H/O Cardiac Arrest due to Hyperkalemia  Possible Aspiration Pneumonia  Anemia- transfused prbc  Currently has right IJ central line  Decreased mental status - Head CT noted. Not responsive. Plans to withdraw care tomorrow    Critically ill. Palliative care team following. EXAM  Vitals:    19 0900   BP:    Pulse: 109   Resp: 18   Temp:    SpO2: 100%       Intake/Output Summary (Last 24 hours) at 19 0940  Last data filed at 19 0600   Gross per 24 hour   Intake              687 ml   Output             1100 ml   Net             -413 ml        Ill Appearing. intubated and on ventilator. Sedated on vent. hypotension on pressor support. External exam of the ears and nose are normal  HENT: exam is normal  Eyes: Pupils are equal, round,  CVS.  Heart sounds are normal.Palpation of the heart is normal. No murmurs. No pericardial rub. RS. Bilateral Basal rales. PA soft , bowel sounds are normal no distension and no tenderness to palpation.   Skin No rash , No palpable nodules  Musculoskeletal: 1+ edema     Active Problems:    Bradycardia    Shock circulatory ([facility]    Acute respiratory failure with hypoxia (HCC)    Hypoxic encephalopathy (HCC)    Torsades de pointes (HCC)    Hyperkalemia    PEA (Pulseless electrical activity) ([facility]    FERMIN (acute kidney injury) ([facility]  Resolved Problems:    * No resolved hospital problems. *        Medications Reviewed by me   sodium phosphate IVPB  15 mmol Intravenous Once    albuterol sulfate HFA  6 puff Inhalation 4x daily    ipratropium  6 puff Inhalation 4x daily    thiamine (VITAMIN B1) IVPB  300 mg Intravenous F31V    folic acid  1 mg Oral Daily    insulin lispro  0-12 Units Subcutaneous Q4H    sodium chloride flush  10 mL Intravenous 2 times per day    pantoprazole  40 mg Intravenous BID    ampicillin-sulbactam  3 g Intravenous Q12H      dextrose      norepinephrine 0.19 mcg/kg/min (02/23/19 0603)    propofol Stopped (02/17/19 1647)    fentaNYL (SUBLIMAZE) infusion Stopped (02/17/19 1530)       Data Review.     Labs reviewed by me       CBC:   Recent Labs      02/21/19 0357 02/22/19 0440 02/23/19 0355   HGB  7.8*  7.7*  7.9*   HCT  24.4*  23.4*  25.4*     BMP:   Recent Labs      02/21/19 0357 02/22/19 0440 02/23/19 0355   NA  135*  140  142   K  4.3  3.7  4.1   CL  101  104  107   CO2  25  24  23   PHOS  3.4  4.6  6.1*   BUN  27*  37*  45*   CREATININE  1.4*  1.2  1.2     Magnesium:   Lab Results   Component Value Date    MG 2.50 02/23/2019    MG 2.50 02/22/2019    MG 2.60 02/21/2019     Lab Results   Component Value Date    CREATININE 1.2 02/23/2019     @CMP: @LABRCNT   CMP:  Recent Labs      02/21/19 0357 02/22/19 0440 02/23/19 0355   NA  135*  140  142   K  4.3  3.7  4.1   CL  101  104  107   CO2  25  24  23   BUN  27*  37*  45*   CREATININE  1.4*  1.2  1.2   CALCIUM  9.0  9.2  9.0   GLUCOSE  121*  106*  134*           CBC:  Recent Labs      02/21/19 0357 02/22/19 0440 02/23/19   0355   HGB  7.8*  7.7*  7.9*   HCT  24.4*  23.4*  25.4*      BMP:  Recent Labs      02/21/19   0357

## 2019-02-23 NOTE — PROGRESS NOTES
Patient assessment completed, no changes. Neuro status unchanged. Titration of levophed for map 65 to 75. Morning labs collected via a-line. No insulin needed at this time. Venecia care provided, bed pad changed, and patient repositioned in bed. No needs at this time. Will continue to monitor.

## 2019-02-24 LAB
ANION GAP SERPL CALCULATED.3IONS-SCNC: 13 MMOL/L (ref 3–16)
BUN BLDV-MCNC: 60 MG/DL (ref 7–20)
CALCIUM SERPL-MCNC: 9.2 MG/DL (ref 8.3–10.6)
CHLORIDE BLD-SCNC: 109 MMOL/L (ref 99–110)
CO2: 24 MMOL/L (ref 21–32)
CREAT SERPL-MCNC: 1.7 MG/DL (ref 0.6–1.2)
GFR AFRICAN AMERICAN: 37
GFR NON-AFRICAN AMERICAN: 30
GLUCOSE BLD-MCNC: 114 MG/DL (ref 70–99)
GLUCOSE BLD-MCNC: 118 MG/DL (ref 70–99)
GLUCOSE BLD-MCNC: 118 MG/DL (ref 70–99)
GLUCOSE BLD-MCNC: 138 MG/DL (ref 70–99)
HCT VFR BLD CALC: 24.9 % (ref 36–48)
HEMOGLOBIN: 7.8 G/DL (ref 12–16)
MAGNESIUM: 2.8 MG/DL (ref 1.8–2.4)
PERFORMED ON: ABNORMAL
PHOSPHORUS: 5.9 MG/DL (ref 2.5–4.9)
POTASSIUM SERPL-SCNC: 4.6 MMOL/L (ref 3.5–5.1)
SODIUM BLD-SCNC: 146 MMOL/L (ref 136–145)

## 2019-02-24 PROCEDURE — 1200000000 HC SEMI PRIVATE

## 2019-02-24 PROCEDURE — 94003 VENT MGMT INPAT SUBQ DAY: CPT

## 2019-02-24 PROCEDURE — 84100 ASSAY OF PHOSPHORUS: CPT

## 2019-02-24 PROCEDURE — 2700000000 HC OXYGEN THERAPY PER DAY

## 2019-02-24 PROCEDURE — 2580000003 HC RX 258: Performed by: INTERNAL MEDICINE

## 2019-02-24 PROCEDURE — 94762 N-INVAS EAR/PLS OXIMTRY CONT: CPT

## 2019-02-24 PROCEDURE — 85014 HEMATOCRIT: CPT

## 2019-02-24 PROCEDURE — 94640 AIRWAY INHALATION TREATMENT: CPT

## 2019-02-24 PROCEDURE — 83735 ASSAY OF MAGNESIUM: CPT

## 2019-02-24 PROCEDURE — 6370000000 HC RX 637 (ALT 250 FOR IP): Performed by: HOSPITALIST

## 2019-02-24 PROCEDURE — C9113 INJ PANTOPRAZOLE SODIUM, VIA: HCPCS | Performed by: INTERNAL MEDICINE

## 2019-02-24 PROCEDURE — 2000000000 HC ICU R&B

## 2019-02-24 PROCEDURE — 80048 BASIC METABOLIC PNL TOTAL CA: CPT

## 2019-02-24 PROCEDURE — 85018 HEMOGLOBIN: CPT

## 2019-02-24 PROCEDURE — 6360000002 HC RX W HCPCS: Performed by: INTERNAL MEDICINE

## 2019-02-24 PROCEDURE — 94770 HC ETCO2 MONITOR DAILY: CPT

## 2019-02-24 RX ORDER — SCOLOPAMINE TRANSDERMAL SYSTEM 1 MG/1
1 PATCH, EXTENDED RELEASE TRANSDERMAL
Status: DISCONTINUED | OUTPATIENT
Start: 2019-02-24 | End: 2019-02-25 | Stop reason: HOSPADM

## 2019-02-24 RX ORDER — HALOPERIDOL 5 MG/ML
2 INJECTION INTRAMUSCULAR EVERY 6 HOURS PRN
Status: DISCONTINUED | OUTPATIENT
Start: 2019-02-24 | End: 2019-02-25 | Stop reason: HOSPADM

## 2019-02-24 RX ADMIN — PANTOPRAZOLE SODIUM 40 MG: 40 INJECTION, POWDER, FOR SOLUTION INTRAVENOUS at 08:05

## 2019-02-24 RX ADMIN — LORAZEPAM 1 MG: 2 INJECTION, SOLUTION INTRAMUSCULAR; INTRAVENOUS at 13:01

## 2019-02-24 RX ADMIN — Medication 10 ML: at 08:05

## 2019-02-24 RX ADMIN — Medication 10 ML: at 20:14

## 2019-02-24 RX ADMIN — PANTOPRAZOLE SODIUM 40 MG: 40 INJECTION, POWDER, FOR SOLUTION INTRAVENOUS at 20:14

## 2019-02-24 RX ADMIN — SODIUM CHLORIDE 3 G: 900 INJECTION INTRAVENOUS at 08:05

## 2019-02-24 RX ADMIN — Medication 6 PUFF: at 08:06

## 2019-02-24 RX ADMIN — Medication 6 PUFF: at 08:07

## 2019-02-24 ASSESSMENT — PAIN SCALES - GENERAL
PAINLEVEL_OUTOF10: 0

## 2019-02-24 ASSESSMENT — PULMONARY FUNCTION TESTS
PIF_VALUE: 20
PIF_VALUE: 20

## 2019-02-24 NOTE — FLOWSHEET NOTE
0800-pt remains on vent with no sedation. Pt damon not follow any commands. Pos gag, pupils, and cough. Will open eyes but does not focus. Assess completed. No family at bedside.

## 2019-02-24 NOTE — CARE COORDINATION
Discharge planning-    Per ICU staff, family requests a referral to [address] ([address] [place] [address]). Spoke with the family, son/ Ilir Jaeger ([phone]) is the main contact. Called [facility], spoke with Elisabeth in intake. Facesheet and hospice consult sent via fax. Plan- Referral to [address] [place] [address].     Will continue to follow for support and discharge planning.    -Mike Rowe, MSW, LSW

## 2019-02-25 VITALS
DIASTOLIC BLOOD PRESSURE: 41 MMHG | RESPIRATION RATE: 16 BRPM | SYSTOLIC BLOOD PRESSURE: 82 MMHG | HEART RATE: 111 BPM | WEIGHT: 176.37 LBS | BODY MASS INDEX: 33.3 KG/M2 | TEMPERATURE: 98 F | OXYGEN SATURATION: 96 % | HEIGHT: 61 IN

## 2019-02-25 LAB — AMMONIA: 56 UMOL/L (ref 11–51)

## 2019-02-25 PROCEDURE — 94770 HC ETCO2 MONITOR DAILY: CPT

## 2019-02-25 PROCEDURE — 82140 ASSAY OF AMMONIA: CPT

## 2019-02-25 PROCEDURE — 94640 AIRWAY INHALATION TREATMENT: CPT

## 2019-02-25 PROCEDURE — 94762 N-INVAS EAR/PLS OXIMTRY CONT: CPT

## 2019-02-25 PROCEDURE — 2700000000 HC OXYGEN THERAPY PER DAY

## 2019-02-25 PROCEDURE — 2580000003 HC RX 258: Performed by: INTERNAL MEDICINE

## 2019-02-25 PROCEDURE — 6360000002 HC RX W HCPCS: Performed by: INTERNAL MEDICINE

## 2019-02-25 PROCEDURE — 6370000000 HC RX 637 (ALT 250 FOR IP): Performed by: INTERNAL MEDICINE

## 2019-02-25 RX ORDER — IPRATROPIUM BROMIDE AND ALBUTEROL SULFATE 2.5; .5 MG/3ML; MG/3ML
1 SOLUTION RESPIRATORY (INHALATION) 4 TIMES DAILY
Status: DISCONTINUED | OUTPATIENT
Start: 2019-02-25 | End: 2019-02-25

## 2019-02-25 RX ORDER — SODIUM CHLORIDE 9 MG/ML
INJECTION, SOLUTION INTRAVENOUS
Status: DISCONTINUED
Start: 2019-02-25 | End: 2019-02-25 | Stop reason: HOSPADM

## 2019-02-25 RX ORDER — IPRATROPIUM BROMIDE AND ALBUTEROL SULFATE 2.5; .5 MG/3ML; MG/3ML
1 SOLUTION RESPIRATORY (INHALATION) 4 TIMES DAILY
Status: DISCONTINUED | OUTPATIENT
Start: 2019-02-25 | End: 2019-02-25 | Stop reason: HOSPADM

## 2019-02-25 RX ORDER — 0.9 % SODIUM CHLORIDE 0.9 %
500 INTRAVENOUS SOLUTION INTRAVENOUS ONCE
Status: COMPLETED | OUTPATIENT
Start: 2019-02-25 | End: 2019-02-25

## 2019-02-25 RX ORDER — IPRATROPIUM BROMIDE AND ALBUTEROL SULFATE 2.5; .5 MG/3ML; MG/3ML
SOLUTION RESPIRATORY (INHALATION)
Status: DISCONTINUED
Start: 2019-02-25 | End: 2019-02-25 | Stop reason: HOSPADM

## 2019-02-25 RX ORDER — 0.9 % SODIUM CHLORIDE 0.9 %
500 INTRAVENOUS SOLUTION INTRAVENOUS ONCE
Status: DISCONTINUED | OUTPATIENT
Start: 2019-02-25 | End: 2019-02-25

## 2019-02-25 RX ADMIN — LORAZEPAM 1 MG: 2 INJECTION, SOLUTION INTRAMUSCULAR; INTRAVENOUS at 11:45

## 2019-02-25 RX ADMIN — IPRATROPIUM BROMIDE AND ALBUTEROL SULFATE 1 AMPULE: .5; 3 SOLUTION RESPIRATORY (INHALATION) at 13:09

## 2019-02-25 RX ADMIN — SODIUM CHLORIDE 500 ML: 9 INJECTION, SOLUTION INTRAVENOUS at 10:02

## 2019-02-25 RX ADMIN — LORAZEPAM 1 MG: 2 INJECTION, SOLUTION INTRAMUSCULAR; INTRAVENOUS at 14:26

## 2019-02-25 ASSESSMENT — PAIN SCALES - GENERAL
PAINLEVEL_OUTOF10: 0

## 2019-02-25 NOTE — PROGRESS NOTES
Patient moaning and grimacing. Family ask for ativan for comfort. Administered 1 mg IVP. [facility]OC here at bedside awaiting on son (POA) to meet and make a decision.